# Patient Record
Sex: FEMALE | Race: BLACK OR AFRICAN AMERICAN | NOT HISPANIC OR LATINO | Employment: FULL TIME | URBAN - METROPOLITAN AREA
[De-identification: names, ages, dates, MRNs, and addresses within clinical notes are randomized per-mention and may not be internally consistent; named-entity substitution may affect disease eponyms.]

---

## 2017-01-05 ENCOUNTER — OFFICE VISIT (OUTPATIENT)
Dept: OPHTHALMOLOGY | Facility: CLINIC | Age: 27
End: 2017-01-05
Payer: COMMERCIAL

## 2017-01-05 DIAGNOSIS — H52.13 MYOPIA OF BOTH EYES WITH ASTIGMATISM: ICD-10-CM

## 2017-01-05 DIAGNOSIS — H52.203 MYOPIA OF BOTH EYES WITH ASTIGMATISM: ICD-10-CM

## 2017-01-05 DIAGNOSIS — H16.203 KERATOCONJUNCTIVITIS, BILATERAL: Primary | ICD-10-CM

## 2017-01-05 PROCEDURE — 92002 INTRM OPH EXAM NEW PATIENT: CPT | Mod: S$GLB,,, | Performed by: OPTOMETRIST

## 2017-01-05 PROCEDURE — 92015 DETERMINE REFRACTIVE STATE: CPT | Mod: S$GLB,,, | Performed by: OPTOMETRIST

## 2017-01-05 PROCEDURE — 99999 PR PBB SHADOW E&M-NEW PATIENT-LVL I: CPT | Mod: PBBFAC,,, | Performed by: OPTOMETRIST

## 2017-01-05 RX ORDER — NEOMYCIN SULFATE, POLYMYXIN B SULFATE AND DEXAMETHASONE 3.5; 10000; 1 MG/ML; [USP'U]/ML; MG/ML
1 SUSPENSION/ DROPS OPHTHALMIC 4 TIMES DAILY
Qty: 5 ML | Refills: 0 | Status: SHIPPED | OUTPATIENT
Start: 2017-01-05 | End: 2017-01-12

## 2017-01-05 NOTE — PROGRESS NOTES
HPI     Eye Problem    Additional comments: irritation OU x 2 weeks           Comments   PT c/o pain, burning, watering and irritation in both eyes for 2 weeks.   Wears cls full time, does not have gls and would like to get a gls rx if   possible today.  Does not sleep in lenses per pt.   Pain Scale:  5  Onset:   2 weeks  OD, OS, OU:   OU*  Discharge:   watering  A.M. Matting:  no  Itch:   At times  Redness:   yes  Photophobia:   yes  Foreign body sensation:   yes  Deep pain:   no  Previous occurrence:   yes  Drops:   no         Last edited by Leigh Ann Bryan MA on 1/5/2017  8:11 AM. (History)            Assessment /Plan     For exam results, see Encounter Report.    Keratoconjunctivitis, bilateral  -     neomycin-polymyxin-dexamethasone (MAXITROL) 3.5mg/mL-10,000 unit/mL-0.1 % DrpS; Place 1 drop into both eyes 4 (four) times daily.  Dispense: 5 mL; Refill: 0  D/c cls wear as soon as possible, pt will be getting spec rx filled  Start maxitrol as directed    Myopia of both eyes with astigmatism  Eyeglass Final Rx     Eyeglass Final Rx      Sphere Cylinder Axis   Right -5.50 +0.50 015   Left -7.50 +0.50 105       Expiration Date:  1/6/2018            RTC 1-2 wks for dilation, PRN sooner if symptoms worsen or persist x 1 wk  Discussed above and all questions were answered.

## 2017-02-10 ENCOUNTER — OFFICE VISIT (OUTPATIENT)
Dept: INTERNAL MEDICINE | Facility: CLINIC | Age: 27
End: 2017-02-10
Payer: COMMERCIAL

## 2017-02-10 VITALS
TEMPERATURE: 98 F | BODY MASS INDEX: 31.16 KG/M2 | WEIGHT: 187 LBS | HEIGHT: 65 IN | SYSTOLIC BLOOD PRESSURE: 128 MMHG | OXYGEN SATURATION: 99 % | DIASTOLIC BLOOD PRESSURE: 84 MMHG | HEART RATE: 73 BPM

## 2017-02-10 DIAGNOSIS — F41.1 GENERALIZED ANXIETY DISORDER: Primary | ICD-10-CM

## 2017-02-10 PROCEDURE — 99202 OFFICE O/P NEW SF 15 MIN: CPT | Mod: S$GLB,,,

## 2017-02-10 PROCEDURE — 99999 PR PBB SHADOW E&M-EST. PATIENT-LVL III: CPT | Mod: PBBFAC,,,

## 2017-02-10 RX ORDER — VENLAFAXINE HYDROCHLORIDE 75 MG/1
75 CAPSULE, EXTENDED RELEASE ORAL DAILY
Qty: 30 CAPSULE | Refills: 11 | Status: SHIPPED | OUTPATIENT
Start: 2017-02-10 | End: 2017-03-10 | Stop reason: SDUPTHER

## 2017-02-10 NOTE — LETTER
February 10, 2017      Jamey Blankenship MD  [address] [id number] [place] [address] Internal Medicine  [address] [facility] [address]  Phone: [phone]          Patient: Alyssa Urena   MR Number: [id number]   YOB: 1990   Date of Visit: 2/10/2017       Dear Dr. Jamey Blankenship:    Thank you for referring Alyssa Urena to me for evaluation. Attached you will find relevant portions of my assessment and plan of care.    If you have questions, please do not hesitate to call me. I look forward to following Alyssa Urena along with you.    Sincerely,    Antonio Stevens, [facility]    Enclosure  CC:  No Recipients    If you would like to receive this communication electronically, please contact [email or web address] or [phone] to request more information on [employer] access.    For providers and/or their staff who would like to refer a patient to [facility], please contact us through our one-stop-shop provider referral line, Raymundo Maria, at [phone].    If you feel you have received this communication in error or would no longer like to receive these types of communications, please e-mail [email or web address]

## 2017-02-10 NOTE — PROGRESS NOTES
Subjective:       Patient ID: Alyssa Urena is a 26 y.o. female.    Chief Complaint: Anxiety (having severe anxiety , stressed at work, down a few co-workers )    HPI   The patient presents with a 3 week complaint of stress and anxiety at work.  She states that her jaw they're understaffed and she feels overworked most of the time.  She was anxiety is intermittent moderate to severe intensity feeling of being overwhelmed.  She states sometimes she has to just go to a room by herself.  She voices associated trouble sleeping stating that she wakes up many times during the night wondering about job duties.  She says she is also getting an abbreviated amount of sleep since she usually has been about 10 or 11 PM and wakes up almost every morning at 3 AM.     Review of Systems   Constitutional: Negative for activity change, appetite change, fatigue and unexpected weight change.   HENT: Negative.    Eyes: Negative.    Respiratory: Negative for cough, chest tightness, shortness of breath and wheezing.    Cardiovascular: Negative for chest pain, palpitations and leg swelling.   Gastrointestinal: Negative for constipation, diarrhea, nausea and vomiting.   Endocrine: Negative.    Genitourinary: Negative.    Musculoskeletal: Negative.    Skin: Negative for color change.   Allergic/Immunologic: Negative.    Neurological: Negative for dizziness, weakness and light-headedness.   Hematological: Negative.    Psychiatric/Behavioral: Negative for sleep disturbance. The patient is nervous/anxious.          Objective:      Physical Exam   Constitutional: She is oriented to person, place, and time. She appears well-developed and well-nourished.   HENT:   Head: Normocephalic and atraumatic.   Eyes: Conjunctivae are normal. Pupils are equal, round, and reactive to light. No scleral icterus.   Neck: Normal range of motion. Neck supple.   Cardiovascular: Normal rate, regular rhythm, normal heart sounds and intact distal pulses.   Exam reveals no gallop and no friction rub.    No murmur heard.  Pulmonary/Chest: Effort normal and breath sounds normal. No respiratory distress. She has no wheezes. She has no rales. She exhibits no tenderness.   Musculoskeletal: Normal range of motion.   Lymphadenopathy:     She has no cervical adenopathy.   Neurological: She is alert and oriented to person, place, and time. She exhibits normal muscle tone. Coordination normal.   Skin: Skin is warm and dry. No rash noted. No erythema. No pallor.   Psychiatric: She has a normal mood and affect. Her behavior is normal. Judgment and thought content normal.   Vitals reviewed.      Assessment:       1. Generalized anxiety disorder          Plan:   Generalized anxiety disorder  -     venlafaxine (EFFEXOR-XR) 75 MG 24 hr capsule; Take 1 capsule (75 mg total) by mouth once daily.  Dispense: 30 capsule; Refill: 11            Disclaimer: This note is prepared using voice recognition software.  As such there may be errors in the dictation.  It has not been proofread.

## 2017-03-01 ENCOUNTER — PATIENT MESSAGE (OUTPATIENT)
Dept: INTERNAL MEDICINE | Facility: CLINIC | Age: 27
End: 2017-03-01

## 2017-03-02 DIAGNOSIS — F51.04 PSYCHOPHYSIOLOGICAL INSOMNIA: Primary | ICD-10-CM

## 2017-03-02 RX ORDER — HYDROXYZINE PAMOATE 25 MG/1
25 CAPSULE ORAL NIGHTLY PRN
Qty: 20 CAPSULE | Refills: 2 | Status: SHIPPED | OUTPATIENT
Start: 2017-03-02 | End: 2017-03-07

## 2017-03-10 DIAGNOSIS — F41.1 GENERALIZED ANXIETY DISORDER: ICD-10-CM

## 2017-03-10 RX ORDER — VENLAFAXINE HYDROCHLORIDE 75 MG/1
75 CAPSULE, EXTENDED RELEASE ORAL DAILY
Qty: 30 CAPSULE | Refills: 11 | Status: SHIPPED | OUTPATIENT
Start: 2017-03-10 | End: 2017-03-30 | Stop reason: DRUGHIGH
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
## 2017-03-30 ENCOUNTER — PATIENT MESSAGE (OUTPATIENT)
Dept: INTERNAL MEDICINE | Facility: CLINIC | Age: 27
End: 2017-03-30

## 2017-03-30 DIAGNOSIS — F32.A DEPRESSION, UNSPECIFIED DEPRESSION TYPE: Primary | ICD-10-CM

## 2017-03-30 RX ORDER — VENLAFAXINE HYDROCHLORIDE 75 MG/1
75 CAPSULE, EXTENDED RELEASE ORAL DAILY
Qty: 30 CAPSULE | Refills: 11 | Status: SHIPPED | OUTPATIENT
Start: 2017-03-30 | End: 2017-07-24 | Stop reason: SDUPTHER

## 2017-04-28 ENCOUNTER — PROCEDURE VISIT (OUTPATIENT)
Dept: OBSTETRICS AND GYNECOLOGY | Facility: CLINIC | Age: 27
End: 2017-04-28
Payer: COMMERCIAL

## 2017-04-28 VITALS
SYSTOLIC BLOOD PRESSURE: 112 MMHG | DIASTOLIC BLOOD PRESSURE: 70 MMHG | BODY MASS INDEX: 30.08 KG/M2 | WEIGHT: 180.75 LBS

## 2017-04-28 DIAGNOSIS — Z12.4 PAP SMEAR FOR CERVICAL CANCER SCREENING: Primary | ICD-10-CM

## 2017-04-28 DIAGNOSIS — Z11.3 SCREEN FOR STD (SEXUALLY TRANSMITTED DISEASE): ICD-10-CM

## 2017-04-28 DIAGNOSIS — Z30.432 ENCOUNTER FOR IUD REMOVAL: ICD-10-CM

## 2017-04-28 PROCEDURE — 87591 N.GONORRHOEAE DNA AMP PROB: CPT

## 2017-04-28 PROCEDURE — 99385 PREV VISIT NEW AGE 18-39: CPT | Mod: 25,S$GLB,, | Performed by: OBSTETRICS & GYNECOLOGY

## 2017-04-28 PROCEDURE — 88175 CYTOPATH C/V AUTO FLUID REDO: CPT | Performed by: PATHOLOGY

## 2017-04-28 PROCEDURE — 58301 REMOVE INTRAUTERINE DEVICE: CPT | Mod: S$GLB,,, | Performed by: OBSTETRICS & GYNECOLOGY

## 2017-04-28 PROCEDURE — 88141 CYTOPATH C/V INTERPRET: CPT | Mod: ,,, | Performed by: PATHOLOGY

## 2017-04-28 NOTE — PROGRESS NOTES
Subjective:       Patient ID: Alyssa Urena is a 26 y.o. female.    Chief Complaint:  mirena removal      History of Present Illness  HPI  Annual Exam-Premenopausal  Patient presents for annual exam. Pt complains of daily vaginal spotting and bleeding for the past 3-4 months.  Pt has Mirena in place (inserted 4 years ago). Pt had no issues with Mirena until these past several months.  Would like to have Mirena removed today and discuss contraception options.  The patient is sexually active. GYN screening history: last pap: approximate date 2016 and was abnormal: pt does not recall result. The patient wears seatbelts: yes. The patient participates in regular exercise: no. Has the patient ever been transfused or tattooed?: no. The patient reports that there is not domestic violence in her life.      GYN & OB History  No LMP recorded. Patient is not currently having periods (Reason: Birth Control).   Date of Last Pap: No result found    OB History   No data available       Review of Systems  Review of Systems   Constitutional: Negative for activity change, appetite change, fatigue, fever and unexpected weight change.   Respiratory: Negative for shortness of breath.    Cardiovascular: Negative for chest pain, palpitations and leg swelling.   Gastrointestinal: Positive for nausea. Negative for abdominal pain, constipation, diarrhea and vomiting.   Genitourinary: Positive for menstrual problem and vaginal bleeding. Negative for dyspareunia, dysuria, genital sores, hematuria, pelvic pain, vaginal discharge, vaginal pain, dysmenorrhea and vaginal odor.   Musculoskeletal: Negative for back pain.   Neurological: Negative for syncope and headaches.   Breast: Negative for breast mass, breast pain, nipple discharge and skin changes          Objective:    Physical Exam:   Constitutional: She is oriented to person, place, and time. She appears well-developed and well-nourished. No distress.     Eyes: EOM are normal.  Pupils are equal, round, and reactive to light.    Neck: Normal range of motion. Neck supple.    Cardiovascular: Normal rate, regular rhythm and normal heart sounds.     Pulmonary/Chest: Effort normal and breath sounds normal.        Abdominal: Soft. Bowel sounds are normal. She exhibits no distension. There is no tenderness.     Genitourinary: Uterus normal.       Pelvic exam was performed with patient supine. There is lesion on the right labia. There is no rash, tenderness or injury on the right labia. There is no rash, tenderness, lesion or injury on the left labia. Uterus is not deviated, not enlarged and not tender. Cervix is normal. Right adnexum displays no mass, no tenderness and no fullness. Left adnexum displays no mass, no tenderness and no fullness. No erythema, tenderness or bleeding in the vagina. No foreign body in the vagina. No signs of injury around the vagina. No vaginal discharge found. Cervix exhibits no motion tenderness, no discharge and no friability. Additional cervical findings: pap smear done  Genitourinary Comments: See procedure note for Mirena removal           Musculoskeletal: Normal range of motion and moves all extremeties. She exhibits no edema or tenderness.       Neurological: She is oriented to person, place, and time.    Skin: Skin is warm and dry.    Psychiatric: She has a normal mood and affect. Her behavior is normal. Thought content normal.          Assessment:        1. Pap smear for cervical cancer screening    2. Encounter for IUD removal    3. Screen for STD (sexually transmitted disease)             Plan:      Pap smear for cervical cancer screening  -     Liquid-based pap smear, screening  -     Pt was counseled on cervical/vaginal screening guidelines and recommendations.  Last pap was abnormal but pt does not recall result on 2016.  If today's pap smear result is negative, next pap smear will be due in 2020.  -     Pt was advised on current breast cancer screening  recommendations.   -     Follow up with PCP for routine health maintenance needs.    Encounter for IUD removal  -     Removal of Intrauterine Device-Today  -     Pt was counseled on common IUD side-effects (including the breakthrough bleeding she is currently experiencing).  Pt also counseled on treatment options and on contraception alternatives.  Pt voiced understanding and desires to proceed with Mirena removal today.  Pt desires to take a small break off of hormones and desires to restart Mirena in near future.      Screen for STD (sexually transmitted disease)  -     C. trachomatis/N. gonorrhoeae by AMP DNA Cervix      Return for Mirena Insertion.

## 2017-04-28 NOTE — PROCEDURES
Removal of Intrauterine Device-Today  Date/Time: 4/28/2017 2:03 PM  Performed by: ABENA IQBAL.  Authorized by: ABENA IQBAL   Local anesthesia used: no    Anesthesia:  Local anesthesia used: no  Sedation:  Patient sedated: no    Patient tolerance: Patient tolerated the procedure well with no immediate complications  Comments: Alyssa Urena is a 26 y.o. female No obstetric history on file. presents for IUD removal because of persistent bleeding.  Pt would like to take a break from contraception but is interested in restarting Mirena in several months.      PRE-IUD REMOVAL COUNSELING:  The patient was advised of minimal risks of bleeding and pain and she agrees to proceed.    PROCEDURE:  TIME OUT PERFORMED.  IUD strings were  visualized at the os and grasped. IUD removed with gentle traction.  The patient tolerated the procedure well.    ASSESSMENT:  Contraceptive Management / Removal IUD. V25.0.    POST IUD REMOVAL COUNSELING:  Expect period-like flow to occur after Mirena IUD removal and periods to return to pre-IUD pattern.  Manage post IUD removal cramping with NSAIDs, Tylenol or Rx per [employer].    POST IUD REMOVAL CONTRACEPTION:  If planning pregnancy, RX for prenatal vitamins.    Counseling lasted approximately 15 minutes and all her questions were answered.    FOLLOW-UP: With me for Mirena insertion.

## 2017-04-29 LAB
C TRACH DNA SPEC QL NAA+PROBE: NOT DETECTED
N GONORRHOEA DNA SPEC QL NAA+PROBE: NOT DETECTED

## 2017-05-03 ENCOUNTER — TELEPHONE (OUTPATIENT)
Dept: OBSTETRICS AND GYNECOLOGY | Facility: CLINIC | Age: 27
End: 2017-05-03

## 2017-05-10 ENCOUNTER — HOSPITAL ENCOUNTER (OUTPATIENT)
Dept: RADIOLOGY | Facility: HOSPITAL | Age: 27
Discharge: HOME | End: 2017-05-10
Attending: ORTHOPAEDIC SURGERY
Payer: COMMERCIAL

## 2017-05-10 DIAGNOSIS — M54.50 LUMBAR SPINE PAIN: ICD-10-CM

## 2017-05-10 DIAGNOSIS — M54.50 LUMBAR SPINE PAIN: Primary | ICD-10-CM

## 2017-05-10 PROCEDURE — 72110 X-RAY EXAM L-2 SPINE 4/>VWS: CPT | Mod: 26,,, | Performed by: RADIOLOGY

## 2017-05-10 PROCEDURE — 72110 X-RAY EXAM L-2 SPINE 4/>VWS: CPT | Mod: TC

## 2017-05-19 ENCOUNTER — TELEPHONE (OUTPATIENT)
Dept: OBSTETRICS AND GYNECOLOGY | Facility: CLINIC | Age: 27
End: 2017-05-19

## 2017-05-19 NOTE — TELEPHONE ENCOUNTER
Attempted to call patient, left voicemail to inform that it has arrived and should call back when she starts her menstrual cycle to schedule appointment.

## 2017-06-20 ENCOUNTER — HOSPITAL ENCOUNTER (OUTPATIENT)
Dept: RADIOLOGY | Facility: HOSPITAL | Age: 27
Discharge: HOME | End: 2017-06-20
Attending: FAMILY MEDICINE
Payer: COMMERCIAL

## 2017-06-20 ENCOUNTER — CLINICAL SUPPORT (OUTPATIENT)
Dept: CARDIOLOGY | Facility: CLINIC | Age: 27
End: 2017-06-20
Payer: COMMERCIAL

## 2017-06-20 ENCOUNTER — OFFICE VISIT (OUTPATIENT)
Dept: INTERNAL MEDICINE | Facility: CLINIC | Age: 27
End: 2017-06-20
Payer: COMMERCIAL

## 2017-06-20 VITALS
DIASTOLIC BLOOD PRESSURE: 80 MMHG | HEIGHT: 65 IN | WEIGHT: 186.75 LBS | TEMPERATURE: 97 F | HEART RATE: 95 BPM | OXYGEN SATURATION: 100 % | SYSTOLIC BLOOD PRESSURE: 124 MMHG | BODY MASS INDEX: 31.11 KG/M2

## 2017-06-20 DIAGNOSIS — R42 DIZZINESS: ICD-10-CM

## 2017-06-20 DIAGNOSIS — R07.9 CHEST PAIN AT REST: ICD-10-CM

## 2017-06-20 DIAGNOSIS — R07.9 CHEST PAIN AT REST: Primary | ICD-10-CM

## 2017-06-20 PROCEDURE — 71020 XR CHEST PA AND LATERAL: CPT | Mod: 26,,, | Performed by: RADIOLOGY

## 2017-06-20 PROCEDURE — 71020 XR CHEST PA AND LATERAL: CPT | Mod: TC,PO

## 2017-06-20 PROCEDURE — 99999 PR PBB SHADOW E&M-EST. PATIENT-LVL IV: CPT | Mod: PBBFAC,,, | Performed by: PHYSICIAN ASSISTANT

## 2017-06-20 PROCEDURE — 99214 OFFICE O/P EST MOD 30 MIN: CPT | Mod: S$GLB,,, | Performed by: PHYSICIAN ASSISTANT

## 2017-06-20 PROCEDURE — 93000 ELECTROCARDIOGRAM COMPLETE: CPT | Mod: S$GLB,,, | Performed by: INTERNAL MEDICINE

## 2017-06-20 RX ORDER — MECLIZINE HCL 12.5 MG 12.5 MG/1
12.5 TABLET ORAL 3 TIMES DAILY PRN
Qty: 15 TABLET | Refills: 0 | Status: SHIPPED | OUTPATIENT
Start: 2017-06-20 | End: 2017-06-29

## 2017-06-20 NOTE — PATIENT INSTRUCTIONS
Dizziness (Vertigo) and Balance Problems: Diagnostic Tests    An otolaryngologist (also called an ENT) is a doctor who specializes in disorders of the ear, nose, and throat. Your ENT can help find clues to the cause of your dizziness. He or she will examine you and go over your health history. Your ENT may also order certain tests to help diagnose your problem.  Hearing testing  In most cases, you will be referred for hearing testing. This is because the nerve that sends balance signals also sends hearing signals. A problem that affects balance can also affect hearing.  Other tests  Your doctor may recommend more than one kind of test. The following tests are painless, but may cause dizziness in some cases.  · MRI creates images of the ear or head. A magnetic field and contrast medium are used to make the image.  · Electronystagmography (ENG) records eye movement. Small electrodes are put on the skin around your eyes. Then your ear is filled with warm or cold water.  · Rotation tests show the relationship between the inner ear and your eyes. You may be asked to wear special goggles or sit in a computerized chair.  · Posturography tests your standing balance under different conditions. You will stand on a platform that measures shifts in your body weight.   · Electrocochleography (ECoG) measures the fluid pressure in the inner ear. An abnormal ECoG may mean you have Meniere's disease or other conditions.  · Vestibular evoked myogenic potentials (VEMPs) may be used if your healthcare provider suspects a rare condition like superior semicircular canal dehiscence. Electrodes are placed on your neck, and you hear clicks in your ear.  Date Last Reviewed: 11/1/2016 © 2000-2016 [employer]. [address] [place] [address], [place] [address]. All rights reserved. This information is not intended as a substitute for professional medical care. Always follow your healthcare professional's instructions.        Dizziness  (Vertigo) and Balance Problems: Staying Safe     Replace burned-out lightbulbs to keep your home safe and well lit.   Falls or accidents can lead to pain, broken bones, and fear of future falls. Protect yourself and others by preparing for episodes. Simple steps can help you stay safe at home and wherever you go.  Lighting  Keep all areas well lit. This helps your eyes send the right signals to the brain. It also makes you less likely to trip and fall. If bright lights make symptoms worse, dim the lights or lie in a dark room until the dizziness passes. Then turn the lights back to their normal level.  Tips:  · Keep a flashlight by the bed.  · Place nightlights in bathrooms and hallways.  · Replace burned-out bulbs, or have someone replace them for you.  Preventing falls  To reduce your risk of falling:  · Get out of bed or up from a chair slowly.  · Wear low-heeled shoes that fit properly and have slip-resistant soles.  · Remove throw rugs. Clear clutter from walkways.  · Use handrails on stairs. Have handrails installed or adjusted if needed.  · Install grab bars in the bathroom. Don't use towel racks for balance.  · Use a shower stool. Also put adhesive strips in the shower or on the tub floor.  Going out  With a little time and preparation, you can get around safely.  Tips:  · Bring a cane or walking aid if needed.  · Give yourself plenty of time in case you start to get dizzy.  · Ask your healthcare provider what type of exercise is safe for your condition.  · Be patient. If an activity such as walking through a crowded shop causes you stress, you may not be ready for it yet.  Driving  If you become dizzy or disoriented while driving, you could hurt yourself and others. That's why it's best to not drive until symptoms have gone away. In some cases, your license may be temporarily held until it's safe for you to drive again.  For safety:  · Ask a friend to drive for you.  · Take public transportation.  · Walk to  stores and other places when you can.  Asking for help  Don't be afraid to ask for help running errands, cooking meals, and doing exercise. Whether it's a friend, loved one, neighbor, or stranger on the street, a little help can make a world of difference.   Date Last Reviewed: 11/1/2016 © 2000-2016 [employer]. [address] [place] [address], [place] [address]. All rights reserved. This information is not intended as a substitute for professional medical care. Always follow your healthcare professional's instructions.        Vertigo (Unknown Cause)    In addition to helping with hearing, the inner ear is part of the balance center of your body. Problems with the inner ear can a false feeling of motion. This is called vertigo. Often, it feels as if you or the room is spinning. A vertigo attack may cause sudden nausea, vomiting and heavy sweating. Severe vertigo causes a loss of balance and can cause you to fall. During vertigo, small head movements and changes in body position will often make the symptoms worse. You may also have ringing in the ears called tinnitus.  An episode of vertigo may last seconds, minutes or hours. Once you are over the first episode, it may never come back. However, symptoms may return off and on.  The cause of your vertigo is not yet known. Possible causes of vertigo include:  · Inflammation of the inner ear  · Disease of the nerves to the inner ear  · Movement of calcium particles in the inner ear  · Poor blood flow to the balance centers of the brain  · Migraine headaches  Home care  · If symptoms are severe, rest quietly in bed. Change positions very slowly. There is usually one position that will feel best, such as lying on one side or lying on your back with your head slightly raised on pillows.  · Do not drive a car or work with dangerous machinery until symptoms have been gone for at least one week.  · Take medicine as prescribed to relieve your symptoms. Unless another  medicine was prescribed for symptoms of nausea, vomiting, and dizziness, you may use over-the-counter motion sickness pills. Ask your pharmacist for suggestions.  Follow-up care  Follow up with your healthcare provider or as directed. If you are referred to a specialist or for testing, make the appointment promptly.  When to seek medical advice  Call your healthcare provider if any of the following occur:  · Fever of 100.4°F (38ºC) or higher, or as directed by your healthcare provider  · Vertigo worsens or is not controlled by prescribed medicine   · Repeated vomiting not relieved by prescribed medicine   · Severe headache  · Confusion  · Weakness of an arm or leg or one side of the face  · Difficulty with speech or vision  · Loss of consciousness   · Seizure  Date Last Reviewed: 8/16/2015 © 2000-2016 [employer]. [address] [place] [address], [place] [address]. All rights reserved. This information is not intended as a substitute for professional medical care. Always follow your healthcare professional's instructions.        Noncardiac Chest Pain    Based on your visit today, the health care provider doesnt know what is causing your chest pain. In most cases, people who come to the emergency department with chest pain dont have a problem with their heart. Instead, the pain is caused by other conditions. These may be problems with the lungs, muscles, bones, digestive tract, nerves, or mental health.  Lung problems  · Inflammation around the lungs (pleurisy)  · Collapsed lung (pneumothorax)  · Fluid around the lungs (pleural effusion)  · Lung cancer. This is a rare cause of chest pain.  Muscle or bone problems  · Inflamed cartilage between the ribs (pleurisy)  · Fibromyalgia  · Rheumatoid arthritis  Digestive system problems  · Reflux  · Stomach ulcer  · Spasms of the esophagus  · Gall stones  · Gallbladder inflammation  Mental health conditions  · Panic or anxiety attacks  · Emotional distress  Your  condition doesnt seem serious and your pain doesnt appear to be coming from your heart. But sometimes the signs of a serious problem take more time to appear. Watch for the warning signs listed below.  Home care  Follow these guidelines when caring for yourself at home:  · Rest today and avoid strenuous activity.  · Take any prescribed medicine as directed.  Follow-up care  Follow up with your health care provider, or as advised, if you dont start to feel better within 24 hours.  When to seek medical advice  Call your health care provider right away if any of these occur:  · A change in the type of pain. Call if it feels different, becomes more serious, lasts longer, or begins to spread into your shoulder, arm, neck, jaw, or back.  · Shortness of breath  · You feel more pain when you breathe  · Cough with dark-colored mucus or blood  · Weakness, dizziness, or fainting  · Fever of 100.4ºF (38ºC) or higher, or as directed by your health care provider  · Swelling, pain, or redness in one leg  Date Last Reviewed: 11/24/2014  © 4451-4655 [employer]. [address] [place] [address]. All rights reserved. This information is not intended as a substitute for professional medical care. Always follow your healthcare professional's instructions.

## 2017-06-20 NOTE — PROGRESS NOTES
Subjective:      Patient ID: Alyssa Urena is a 26 y.o. female.    Chief Complaint: Dizziness    Denies having problems with chest pain or dizziness in the past.       Dizziness:   Chronicity:  New  Progression since onset:  Resolved, then recurrent and gradually worsening  Frequency:  Hourly  Severity:  Disabling  Duration:  Very brief  Dizziness characteristics: world is spinning.  Frequency of Spells:  Daily   Associated symptoms: headaches and chest pain (started prior to the dizziness).no hearing loss, no ear congestion, no ear pain, no fever, no tinnitus, no nausea, no vomiting, no diaphoresis, no aural fullness, no weakness, no visual disturbances, no light-headedness, no syncope, no palpitations, no panic, no facial weakness, no slurred speech and no numbness in extremities.   Extremity tingling  Aggravated by:  Position changes  Treatments tried:  Nothing   PMH includes: anxiety.no strokes, no cardiac surgery, no neurologic disease, no head trauma, no balance testing, no ear trauma, no ear surgery, no head trauma, no ear infections, no ear tubes, no environmental allergies, no MRI head and no CT head.  Chest Pain    This is a new problem. The current episode started in the past 7 days. The onset quality is sudden. The problem occurs intermittently. The problem has been waxing and waning. The pain is moderate. The quality of the pain is described as sharp and pressure. The pain does not radiate. Associated symptoms include dizziness and headaches. Pertinent negatives include no abdominal pain, back pain, claudication, cough, diaphoresis, exertional chest pressure, fever, hemoptysis, irregular heartbeat, leg pain, lower extremity edema, malaise/fatigue, nausea, near-syncope, numbness, orthopnea, palpitations, PND, shortness of breath, sputum production, syncope, vomiting or weakness. Associated symptoms comments: Woke her up in the middle of the night, also had tingling going down both her  arms. Only lasted a few seconds. . Treatments tried: hyperventilating  The treatment provided moderate relief.   Her past medical history is significant for anxiety/panic attacks.   Pertinent negatives for past medical history include no aneurysm, no aortic aneurysm, no aortic dissection, no bicuspid aortic valve, no CAD, no cancer, no congenital heart disease, no connective tissue disease, no COPD, no CHF, no diabetes, no DVT, no hyperhomocysteinemia, no hyperlipidemia, no hypertension, no Kawasaki disease, no Marfan's syndrome, no MI, no mitral valve prolapse, no pacemaker, no PE, no PVD, no recent injury, no rheumatic fever, no seizures, no sickle cell disease, no sleep apnea, no spontaneous pneumothorax, no stimulant use, no strokes, no thyroid problem, no TIA, Mcpherson syndrome and no valve disorder.   Her family medical history is significant for diabetes (grandmother), heart disease, hypertension and TIA (mom).   Pertinent negatives for family medical history include: no aortic dissection, no CAD, no connective tissue disease, no early MI (uncle had heart attack in his 40's), no PE, no PVD, no sickle cell disease, no stroke and no sudden death.       Review of Systems   Constitutional: Negative for activity change, appetite change, chills, diaphoresis, fatigue, fever, malaise/fatigue and unexpected weight change.   HENT: Negative for congestion, ear pain, facial swelling, hearing loss, mouth sores, nosebleeds, postnasal drip, rhinorrhea, sinus pressure, sneezing, sore throat, tinnitus, trouble swallowing and voice change.    Eyes: Negative.  Negative for visual disturbance.   Respiratory: Positive for chest tightness. Negative for cough, hemoptysis, sputum production, choking, shortness of breath and stridor.    Cardiovascular: Positive for chest pain (started prior to the dizziness). Negative for palpitations, orthopnea, claudication, leg swelling, syncope, PND and near-syncope.   Gastrointestinal: Negative  "for abdominal distention, abdominal pain, anal bleeding, blood in stool, constipation, diarrhea, nausea and vomiting.   Endocrine: Negative for cold intolerance, heat intolerance, polydipsia and polyuria.   Genitourinary: Negative.  Negative for difficulty urinating and frequency.   Musculoskeletal: Negative for arthralgias, back pain, gait problem, joint swelling and myalgias.   Skin: Negative for color change, pallor, rash and wound.   Allergic/Immunologic: Negative for environmental allergies.   Neurological: Positive for dizziness and headaches. Negative for tremors, seizures, facial asymmetry, weakness, light-headedness and numbness.   Hematological: Negative for adenopathy.   Psychiatric/Behavioral: Negative for behavioral problems, confusion, self-injury, sleep disturbance and suicidal ideas. The patient is not nervous/anxious.      Objective:   /80   Pulse 95   Temp 97 °F (36.1 °C) (Tympanic)   Ht 5' 5" (1.651 m)   Wt 84.7 kg (186 lb 11.7 oz)   SpO2 100%   BMI 31.07 kg/m²     Physical Exam   Constitutional: She is oriented to person, place, and time. She appears well-developed and well-nourished. She is cooperative.   HENT:   Head: Normocephalic and atraumatic.   Right Ear: Hearing, tympanic membrane, external ear and ear canal normal.   Left Ear: Hearing, tympanic membrane, external ear and ear canal normal.   Nose: Nose normal. No mucosal edema.   Mouth/Throat: Uvula is midline, oropharynx is clear and moist and mucous membranes are normal. No tonsillar exudate.   Eyes: Conjunctivae and EOM are normal. Pupils are equal, round, and reactive to light. Right eye exhibits no discharge. Left eye exhibits no discharge. No scleral icterus.   Neck: Normal range of motion. Neck supple. Normal carotid pulses and no JVD present. Carotid bruit is not present. No edema present. No thyromegaly present.   Cardiovascular: Normal rate, regular rhythm and normal heart sounds.  Exam reveals no gallop and no " friction rub.    No murmur heard.  Pulmonary/Chest: Effort normal and breath sounds normal. No accessory muscle usage. No respiratory distress. She has no wheezes. She has no rales. She exhibits no tenderness.   Abdominal: Soft. Bowel sounds are normal. She exhibits no distension and no mass. There is no tenderness. There is no rebound and no guarding.   Musculoskeletal: Normal range of motion. She exhibits no edema, tenderness or deformity.   Lymphadenopathy:     She has no cervical adenopathy.   Neurological: She is alert and oriented to person, place, and time. She displays normal reflexes. No cranial nerve deficit or sensory deficit. She exhibits normal muscle tone. She displays a negative Romberg sign. Coordination and gait normal.   Skin: Skin is warm, dry and intact. No rash noted. No cyanosis or erythema. No pallor. Nails show no clubbing.   Psychiatric: She has a normal mood and affect. Her behavior is normal. Judgment and thought content normal.   Nursing note and vitals reviewed.      Assessment:     1. Chest pain at rest    2. Dizziness      Plan:   Chest pain at rest  -     CBC auto differential; Future; Expected date: 06/20/2017  -     Comprehensive metabolic panel; Future  -     TSH; Future  -     EKG 12-lead; Future  -     X-Ray Chest PA And Lateral; Future; Expected date: 06/20/2017    Dizziness  -     CBC auto differential; Future; Expected date: 06/20/2017  -     Comprehensive metabolic panel; Future  -     TSH; Future  -     EKG 12-lead; Future  -     Ambulatory referral to ENT  -     meclizine (ANTIVERT) 12.5 mg tablet; Take 1 tablet (12.5 mg total) by mouth 3 (three) times daily as needed for Dizziness.  Dispense: 15 tablet; Refill: 0      -discontinue meclizine at 3 days prior to appointment with ENT. Dizziness precautions discussed. A handout provided.     Return if symptoms worsen or fail to improve.

## 2017-06-26 ENCOUNTER — PATIENT MESSAGE (OUTPATIENT)
Dept: OBSTETRICS AND GYNECOLOGY | Facility: CLINIC | Age: 27
End: 2017-06-26

## 2017-06-27 NOTE — TELEPHONE ENCOUNTER
I see that you have an appointment scheduled on 06/29 which is on Thursday at 8:30am.  You can still keep this appointment, however since you want to have the mirena inserted we will use this appointment to insert the device and if you are due for your pap smear we can schedule for another day.  Please let me know if this is ok for you.

## 2017-06-29 ENCOUNTER — OFFICE VISIT (OUTPATIENT)
Dept: OBSTETRICS AND GYNECOLOGY | Facility: CLINIC | Age: 27
End: 2017-06-29
Payer: COMMERCIAL

## 2017-06-29 VITALS
SYSTOLIC BLOOD PRESSURE: 122 MMHG | HEIGHT: 65 IN | WEIGHT: 185.19 LBS | BODY MASS INDEX: 30.85 KG/M2 | DIASTOLIC BLOOD PRESSURE: 92 MMHG

## 2017-06-29 DIAGNOSIS — Z30.430 ENCOUNTER FOR IUD INSERTION: Primary | ICD-10-CM

## 2017-06-29 PROCEDURE — 58300 INSERT INTRAUTERINE DEVICE: CPT | Mod: S$GLB,,, | Performed by: OBSTETRICS & GYNECOLOGY

## 2017-06-29 PROCEDURE — 81025 URINE PREGNANCY TEST: CPT | Mod: QW,S$GLB,, | Performed by: OBSTETRICS & GYNECOLOGY

## 2017-06-29 PROCEDURE — 99499 UNLISTED E&M SERVICE: CPT | Mod: S$GLB,,, | Performed by: OBSTETRICS & GYNECOLOGY

## 2017-06-29 PROCEDURE — 99999 PR PBB SHADOW E&M-EST. PATIENT-LVL II: CPT | Mod: PBBFAC,,, | Performed by: OBSTETRICS & GYNECOLOGY

## 2017-06-29 NOTE — PROCEDURES
INSERTION OF INTRAUTERINE DEVICE  Date/Time: 6/29/2017 9:31 AM  Performed by: ABENA IQBAL.  Authorized by: ABENA IQBAL   Preparation: Patient was prepped and draped in the usual sterile fashion.  Local anesthesia used: no    Anesthesia:  Local anesthesia used: no    Sedation:  Patient sedated: no  Patient tolerance: Patient tolerated the procedure well with no immediate complications  Comments: CC: MIRENA  IUD PLACEMENT    Alyssa Urena is a 26 y.o. female No obstetric history on file. presents for a Mirena IUD placement.      UPT is negative.      PRE-IUD PLACEMENT COUNSELING:  All contraceptive options were reviewed and the patient chooses an IUD.  The patient's history was reviewed and there are no contraindications to an IUD. The procedure and minimal risks of pain, bleeding, perforation, failure of function with resultant pregnancy (intrauterine or ectopic) and spontaneous expulsion were discussed. The benefits were explained. All questions were answered and the patient agrees to proceed. Consent was signed (scanned into computer).    EXAM:  Uterine Position: anteverted, normal size, nontender    PROCEDURE:  TIME OUT PERFORMED.  The cervix visualized with a speculum.  A single tooth tenaculum placed on the anterior lip.  The uterus sounded to 8 cm using sterile technique.  A Mirena IUD was loaded and placed high in uterine fundus without difficulty using sterile technique.  The string was cut to 2cm length from exo cervix.  The tenaculum and speculum were removed. The patient tolerated the procedure well.    ASSESSMENT:  1. Contraception management / IUD insertion.V25.0.    POST IUD PLACEMENT COUNSELING:  Manage post IUD placement pain with NSAIDs, Tylenol or Rx per [facility].  IUD danger signs and how to check the strings.  Removal in 5 years.    Counseling lasted approximately 15 minutes and all her questions were answered.    FOLLOW-UP: With me in 4 weeks.

## 2017-07-06 ENCOUNTER — HOSPITAL ENCOUNTER (OUTPATIENT)
Dept: RADIOLOGY | Facility: HOSPITAL | Age: 27
Discharge: HOME | End: 2017-07-06
Attending: ORTHOPAEDIC SURGERY
Payer: COMMERCIAL

## 2017-07-06 DIAGNOSIS — M25.561 CHRONIC PAIN OF RIGHT KNEE: Primary | ICD-10-CM

## 2017-07-06 DIAGNOSIS — G89.29 CHRONIC PAIN OF RIGHT KNEE: ICD-10-CM

## 2017-07-06 DIAGNOSIS — G89.29 CHRONIC PAIN OF RIGHT KNEE: Primary | ICD-10-CM

## 2017-07-06 DIAGNOSIS — M25.561 CHRONIC PAIN OF RIGHT KNEE: ICD-10-CM

## 2017-07-06 PROCEDURE — 73560 X-RAY EXAM OF KNEE 1 OR 2: CPT | Mod: TC,PO,LT

## 2017-07-06 PROCEDURE — 73562 X-RAY EXAM OF KNEE 3: CPT | Mod: 26,RT,, | Performed by: RADIOLOGY

## 2017-07-06 PROCEDURE — 73560 X-RAY EXAM OF KNEE 1 OR 2: CPT | Mod: 26,LT,, | Performed by: RADIOLOGY

## 2017-07-20 ENCOUNTER — OFFICE VISIT (OUTPATIENT)
Dept: INTERNAL MEDICINE | Facility: CLINIC | Age: 27
End: 2017-07-20
Payer: COMMERCIAL

## 2017-07-20 ENCOUNTER — LAB VISIT (OUTPATIENT)
Dept: LAB | Facility: HOSPITAL | Age: 27
End: 2017-07-20
Attending: INTERNAL MEDICINE
Payer: COMMERCIAL

## 2017-07-20 VITALS
HEART RATE: 136 BPM | HEIGHT: 65 IN | WEIGHT: 187.19 LBS | TEMPERATURE: 97 F | SYSTOLIC BLOOD PRESSURE: 122 MMHG | BODY MASS INDEX: 31.19 KG/M2 | OXYGEN SATURATION: 99 % | DIASTOLIC BLOOD PRESSURE: 78 MMHG

## 2017-07-20 DIAGNOSIS — R42 DIZZINESS: ICD-10-CM

## 2017-07-20 DIAGNOSIS — Z81.8 FAMILY HISTORY OF BIPOLAR DISORDER: ICD-10-CM

## 2017-07-20 DIAGNOSIS — Z82.49 FAMILY HISTORY OF EARLY CAD: ICD-10-CM

## 2017-07-20 DIAGNOSIS — R45.86 MOOD SWINGS: Primary | ICD-10-CM

## 2017-07-20 DIAGNOSIS — E03.8 SUBCLINICAL HYPOTHYROIDISM: ICD-10-CM

## 2017-07-20 LAB — TSH SERPL DL<=0.005 MIU/L-ACNC: 1.47 UIU/ML

## 2017-07-20 PROCEDURE — 99213 OFFICE O/P EST LOW 20 MIN: CPT | Mod: S$GLB,,, | Performed by: PHYSICIAN ASSISTANT

## 2017-07-20 PROCEDURE — 99999 PR PBB SHADOW E&M-EST. PATIENT-LVL IV: CPT | Mod: PBBFAC,,, | Performed by: PHYSICIAN ASSISTANT

## 2017-07-20 PROCEDURE — 84443 ASSAY THYROID STIM HORMONE: CPT

## 2017-07-20 PROCEDURE — 36415 COLL VENOUS BLD VENIPUNCTURE: CPT | Mod: PO

## 2017-07-20 NOTE — PROGRESS NOTES
Subjective:       Patient ID: Alyssa Urena is a 26 y.o. female.    Chief Complaint: Mood Swings    26 year old female c/o mood swings X couple weeks. Pt does not have a PCP. She reports intermittent mood swings from feeling happy to feeling very sad and crying. She reports she does not know why they are happening and reports no trauma or recent changes in her life. No new stressors. She denies any SI/HI. She reports she lives with her parents and they are supportive. She reports bipolar disorder runs in her family and she is concerned she may have this. She is taking Effexor daily. She also had chest pain with associated spinning dizziness about one month ago and was seen by a different provider at that time. EKG and CXR were normal and TSH was slightly elevated - FT4 normal. She reports early CAD family history and has not seen a cardiologist. She reports chest pain (which occurred randomly) seems to have resolved and she denies any current CP. She has taken Antivert for her dizziness with relief. She reports no palpitations, fever, chills, cough, edema, exertional sxs, abd pain, chance of pregnancy, N/V, diaphoresis, sxs of infection, or other medical complaints. She reports she does not smoke.       Review of Systems   Constitutional: Negative for chills and fever.   HENT: Negative for congestion and sore throat.    Respiratory: Negative for cough and shortness of breath.    Cardiovascular: Negative for palpitations and leg swelling.   Gastrointestinal: Negative for abdominal pain, nausea and vomiting.   Genitourinary: Negative for difficulty urinating, dysuria, frequency and urgency.   Skin: Negative for rash.   Neurological: Positive for dizziness. Negative for syncope, speech difficulty, weakness, numbness and headaches.   Psychiatric/Behavioral: Negative for confusion.       Objective:       Vitals:    07/20/17 0845   BP: 122/78   Pulse: (!) 136   Temp: 97.1 °F (36.2 °C)   TempSrc: Tympanic  "  SpO2: 99%   Weight: 84.9 kg (187 lb 2.7 oz)   Height: 5' 5" (1.651 m)     Physical Exam   Constitutional: She is oriented to person, place, and time. She appears well-developed and well-nourished. No distress.   HENT:   Head: Normocephalic and atraumatic.   Right Ear: Tympanic membrane and ear canal normal.   Left Ear: Tympanic membrane and ear canal normal.   Eyes: EOM are normal. No scleral icterus.   Neck: Neck supple.   Cardiovascular: Normal rate and regular rhythm.    Pt tachycardic upon triage - resolved by the time of her physical exam; asymptomatic   Pulmonary/Chest: Effort normal and breath sounds normal. No respiratory distress. She has no decreased breath sounds. She has no wheezes. She has no rhonchi. She has no rales.   Abdominal: Soft. Bowel sounds are normal. She exhibits no distension and no mass. There is no tenderness. There is no rigidity, no rebound, no guarding and no CVA tenderness.   Musculoskeletal: Normal range of motion. She exhibits no edema.   Neurological: She is alert and oriented to person, place, and time. No cranial nerve deficit.   Skin: Skin is warm and dry. No rash noted.   Psychiatric: She has a normal mood and affect. Her speech is normal and behavior is normal. Thought content normal.       Component      Latest Ref Rng & Units 6/20/2017   WBC      3.90 - 12.70 K/uL 4.48   RBC      4.00 - 5.40 M/uL 4.21   Hemoglobin      12.0 - 16.0 g/dL 12.7   Hematocrit      37.0 - 48.5 % 39.1   MCV      82 - 98 fL 93   MCH      27.0 - 31.0 pg 30.2   MCHC      32.0 - 36.0 % 32.5   RDW      11.5 - 14.5 % 11.9   Platelets      150 - 350 K/uL 191   MPV      9.2 - 12.9 fL 11.1   Gran #      1.8 - 7.7 K/uL 2.1   Lymph #      1.0 - 4.8 K/uL 1.9   Mono #      0.3 - 1.0 K/uL 0.3   Eos #      0.0 - 0.5 K/uL 0.1   Baso #      0.00 - 0.20 K/uL 0.05   Gran%      38.0 - 73.0 % 47.1   Lymph%      18.0 - 48.0 % 42.9   Mono%      4.0 - 15.0 % 7.6   Eosinophil%      0.0 - 8.0 % 1.3   Basophil%      0.0 - " 1.9 % 1.1   Differential Method       Automated   Sodium      136 - 145 mmol/L 138   Potassium      3.5 - 5.1 mmol/L 3.8   Chloride      95 - 110 mmol/L 106   CO2      23 - 29 mmol/L 24   Glucose      70 - 110 mg/dL 91   BUN, Bld      6 - 20 mg/dL 8   Creatinine      0.5 - 1.4 mg/dL 0.9   Calcium      8.7 - 10.5 mg/dL 9.3   Total Protein      6.0 - 8.4 g/dL 8.1   Albumin      3.5 - 5.2 g/dL 3.8   Total Bilirubin      0.1 - 1.0 mg/dL 0.3   Alkaline Phosphatase      55 - 135 U/L 47 (L)   AST      10 - 40 U/L 11   ALT      10 - 44 U/L 10   Anion Gap      8 - 16 mmol/L 8   eGFR if African American      >60 mL/min/1.73 m:2 >60   eGFR if non African American      >60 mL/min/1.73 m:2 >60   TSH      0.400 - 4.000 uIU/mL 4.222 (H)   Free T4      0.71 - 1.51 ng/dL 1.07       Assessment:       1. Mood swings    2. Family history of bipolar disorder    3. Family history of early CAD    4. Dizziness    5. Subclinical hypothyroidism        Plan:         Alyssa was seen today for mood swings.    Diagnoses and all orders for this visit:    Family history of early CAD  -     Ambulatory referral to Cardiology    Family history of bipolar disorder  -     Ambulatory referral to Psychiatry    Mood swings  -     Ambulatory referral to Psychiatry    Subclinical hypothyroidism  -     TSH; Future    Dizziness  -     TSH; Future    Spoke with Dr. Cortés today - he reports he will put patient on waiting list and will try to see pt as soon as he has a cancellation on his schedule. Pt informed of this and also recommend she go immediately to the ER if sxs worsen before then. She denies any SI/HI and reports having her parents at home with her.   F/u / est care with PCP for health management.

## 2017-07-24 ENCOUNTER — OFFICE VISIT (OUTPATIENT)
Dept: PSYCHIATRY | Facility: CLINIC | Age: 27
End: 2017-07-24
Payer: COMMERCIAL

## 2017-07-24 DIAGNOSIS — F31.32 BIPOLAR AFFECTIVE DISORDER, CURRENTLY DEPRESSED, MODERATE: Primary | ICD-10-CM

## 2017-07-24 DIAGNOSIS — F32.A DEPRESSION, UNSPECIFIED DEPRESSION TYPE: ICD-10-CM

## 2017-07-24 PROCEDURE — 90792 PSYCH DIAG EVAL W/MED SRVCS: CPT | Mod: S$GLB,,, | Performed by: PSYCHIATRY & NEUROLOGY

## 2017-07-24 RX ORDER — QUETIAPINE FUMARATE 100 MG/1
TABLET, FILM COATED ORAL
Qty: 45 TABLET | Refills: 2 | Status: SHIPPED | OUTPATIENT
Start: 2017-07-24 | End: 2017-08-24 | Stop reason: SDUPTHER

## 2017-07-24 RX ORDER — VENLAFAXINE HYDROCHLORIDE 75 MG/1
150 CAPSULE, EXTENDED RELEASE ORAL DAILY
Qty: 60 CAPSULE | Refills: 2 | Status: SHIPPED | OUTPATIENT
Start: 2017-07-24 | End: 2017-09-25 | Stop reason: SDUPTHER

## 2017-07-24 NOTE — PROGRESS NOTES
Outpatient Psychiatry Initial Visit (MD/NP)    7/24/2017    Alyssa Urena, a 26 y.o. female, presenting for initial evaluation visit. Met with patient.    Reason for Encounter: Referred by BRIDGETTE Cruz. Patient complains of mood lability (depression, anxiety)    History of Present Illness: 27 y/o F referred by PA for mood lability. First presented to [facility] in February:     3 week complaint of stress and anxiety at work... states that her job they're understaffed and she feels overworked most of the time... anxiety is intermittent moderate to severe intensity feeling of being overwhelmed. She states sometimes she has to just go to a room by herself... associated trouble sleeping stating that she wakes up many times during the night wondering about job duties. She says she is also getting an abbreviated amount of sleep since she usually has been about 10 or 11 PM and wakes up almost every morning at 3 AM.    Was started on venlafaxine. She requested and received an increased dose in March. She had an evaluation for unexplained CP and dizziness in June (workup was unrevealing).     Represented in July for ongoing mood symptoms. From BRIDGETTE Cruz's note (highlights mine):     26 year old female c/o mood swings X couple weeks. Pt does not have a PCP. She reports intermittent mood swings from feeling happy to feeling very sad and crying. She reports she does not know why they are happening and reports no trauma or recent changes in her life. No new stressors. She denies any SI/HI. She reports she lives with her parents and they are supportive. She reports bipolar disorder runs in her family and she is concerned she may have this. She is taking Effexor daily. She also had chest pain with associated spinning dizziness about one month ago and was seen by a different provider at that time. EKG and CXR were normal and TSH was slightly elevated - FT4 normal. She reports early CAD family history and has not seen a  "cardiologist. She reports chest pain (which occurred randomly) seems to have resolved and she denies any current CP. She has taken Antivert for her dizziness with relief. She reports no palpitations, fever, chills, cough, edema, exertional sxs, abd pain, chance of pregnancy, N/V, diaphoresis, sxs of infection, or other medical complaints. She reports she does not smoke.     Confirms this hx today, reports "my mood swings are crazy". "I go from super happy to super depressed and I don't know why." Experiences "dark thoughts" including suicidal thoughts & fantasies, but no plans or intent. Denies new stressors identified to trigger moods. Denies episodes prior to recent months. Moods initially swung between euthymic, sad without provocation for weeks but then between euthymic, anxious and sad, and most recently have began to be mostly irritable with tinge of anxiety, featuring frequent racing thoughts whitout pressured speech. Drinks daily - "it calms me". More irritable without it, relaxed with it. No withdrawal symptoms on discontinuation. No tolerance. Bottle of wine daily x 1 year. Rare beer or liquor. Wasn't originally to bring self relief. Had one hangover. No morning alcohol. None during workday. No illicits. No prescription misuse.     PsychHx: as above; denies otherwise.    MedHx: Heart starts racing, short of breath, crying spells. Triggered by stress. Doesn't happen at home. Never happens for no reason at all.   Meds: effexor since February or March - sebas better with anxiety. "not a complete breakdown".   antivert - workup with PCP.   FamHx: GM - ; no substance abuse problems.   Fam, SocHx: reviewed. bipolar/scz, aunt bipolar disorder. Works as medical assistant for orthopedic surgeons. Takes a lot of the messages.    Review Of Systems:     GENERAL:  No weight gain or loss  SKIN:  No rashes or lacerations  HEAD:  No headaches  EYES:  No exophthalmos, jaundice or blindness  EARS:  No dizziness, tinnitus or " hearing loss  NOSE:  No changes in smell  MOUTH & THROAT:  No dyskinetic movements or obvious goiter  CHEST:  No shortness of breath, hyperventilation or cough  CARDIOVASCULAR:  No tachycardia or chest pain  ABDOMEN:  No nausea, vomiting, pain, constipation or diarrhea  URINARY:  No frequency, dysuria or sexual dysfunction  ENDOCRINE:  No polydipsia, polyuria  MUSCULOSKELETAL:  No pain or stiffness of the joints  NEUROLOGIC:  No weakness, sensory changes, seizures, confusion, memory loss, tremor or other abnormal movements      Current Evaluation:     Nutritional Screening: Considering the patient's height and weight, medications, medical history and preferences, should a referral be made to the dietitian? no    Constitutional  Vitals:  Most recent vital signs, dated less than 90 days prior to this appointment, were not reviewed.    There were no vitals filed for this visit.     General:  unremarkable, age appropriate     Musculoskeletal  Muscle Strength/Tone:  no dystonia, no tremor   Gait & Station:  non-ataxic     Psychiatric  Appearance: casually dressed & groomed;   Behavior: calm,   Cooperation: cooperative with assessment  Speech: normal rate, volume, tone  Thought Process: linear, goal-directed  Thought Content: No suicidal or homicidal ideation; no delusions  Affect: anxious  Mood: irritable  Perceptions: No auditory or visual hallucinations  Level of Consciousness: alert throughout interview  Insight: fair  Cognition: Oriented to person, place, time, & situation  Memory: no apparent deficits to general clinical interview; not formally assessed  Attention/Concentration: no apparent deficits to general clinical interview; not formally assessed  Fund of Knowledge: average by vocabulary/education    Laboratory Data  Lab Visit on 07/20/2017   Component Date Value Ref Range Status    TSH 07/20/2017 1.467  0.400 - 4.000 uIU/mL Final     Medications  Outpatient Encounter Prescriptions as of 7/24/2017   Medication  Sig Dispense Refill    venlafaxine (EFFEXOR-XR) 75 MG 24 hr capsule Take 1 capsule (75 mg total) by mouth once daily. 30 capsule 11     No facility-administered encounter medications on file as of 7/24/2017.      Assessment - Diagnosis - Goals:     Impression: Greater than 6 months of intermittent mood episodes starting with anxiety but later featuring prolonged considerably lability, low moods, suicidal thoughts, irritability. Unclear current benefit from venlafaxine, but no worse.      Dx: bipolar 2 disorder    Treatment Goals:  Specify outcomes written in observable, behavioral terms:   Decrease mood lability, foster euthymia by self-report, questionnaires    Treatment Plan/Recommendations:   · Quetiapine for mood stabilization. Continue venlafaxine. Discussed risks, benefits, and alternatives to treatment plan documented above with patient. I answered all patient questions related to this plan and patient expressed understanding and agreement.   · 2 months    Return to Clinic: 2 months    Counseling time: 10 minutes  Total time: 50 minutes    HIMA Guillen MD

## 2017-08-18 ENCOUNTER — OFFICE VISIT (OUTPATIENT)
Dept: CARDIOLOGY | Facility: CLINIC | Age: 27
End: 2017-08-18
Payer: COMMERCIAL

## 2017-08-18 VITALS
SYSTOLIC BLOOD PRESSURE: 124 MMHG | DIASTOLIC BLOOD PRESSURE: 80 MMHG | HEART RATE: 76 BPM | HEIGHT: 65 IN | WEIGHT: 183 LBS | BODY MASS INDEX: 30.49 KG/M2

## 2017-08-18 DIAGNOSIS — R07.9 CHEST PAIN SYNDROME: Primary | ICD-10-CM

## 2017-08-18 DIAGNOSIS — Z82.49 FH: CAD (CORONARY ARTERY DISEASE): ICD-10-CM

## 2017-08-18 PROCEDURE — 99999 PR PBB SHADOW E&M-EST. PATIENT-LVL III: CPT | Mod: PBBFAC,,, | Performed by: INTERNAL MEDICINE

## 2017-08-18 PROCEDURE — 3008F BODY MASS INDEX DOCD: CPT | Mod: S$GLB,,, | Performed by: INTERNAL MEDICINE

## 2017-08-18 PROCEDURE — 99203 OFFICE O/P NEW LOW 30 MIN: CPT | Mod: S$GLB,,, | Performed by: INTERNAL MEDICINE

## 2017-08-18 NOTE — PROGRESS NOTES
Subjective:   Patient ID:  Alyssa Urena is a 26 y.o. female who presents for evaluation of No chief complaint on file.      HPI  A 27 yo female is self referred for  chest pain she ahs a very strong fh for premature heart disease. She has been complaining of intermittent rt sided sharp chest pain short lived has associated numbness in rt arm has no palpitation no syncope no real triggers. It is not associated with respiration. She exercises and has no symptoms. She is concerned because of family history. She eats fried food fast food salty fried cokes  She uses effexor and seroquel to sleep. Her ekg is normal. She has no palpitations no syncope near syncope   Past Medical History:   Diagnosis Date    Chest pain syndrome 8/18/2017    FH: CAD (coronary artery disease) 8/18/2017       Past Surgical History:   Procedure Laterality Date    OOPHORECTOMY  2014       Social History   Substance Use Topics    Smoking status: Never Smoker    Smokeless tobacco: Never Used    Alcohol use No       Family History   Problem Relation Age of Onset    Cataracts Maternal Grandmother     Diabetes Maternal Grandmother     Cataracts Maternal Grandfather     Hypertension Mother     Coronary artery disease Father      MI age 40s    Coronary artery disease Paternal Uncle      age 30s       Current Outpatient Prescriptions   Medication Sig    quetiapine (SEROQUEL) 100 MG Tab Take 1/2 tablet at bedtime for 3 days then 1 tablet at bedtime for 3 days then 1 and 1/2 tablet at bedtime for 3 days    venlafaxine (EFFEXOR-XR) 75 MG 24 hr capsule Take 2 capsules (150 mg total) by mouth once daily.     No current facility-administered medications for this visit.      Current Outpatient Prescriptions on File Prior to Visit   Medication Sig    quetiapine (SEROQUEL) 100 MG Tab Take 1/2 tablet at bedtime for 3 days then 1 tablet at bedtime for 3 days then 1 and 1/2 tablet at bedtime for 3 days    venlafaxine (EFFEXOR-XR) 75 MG  24 hr capsule Take 2 capsules (150 mg total) by mouth once daily.     No current facility-administered medications on file prior to visit.        Review of patient's allergies indicates:   Allergen Reactions    Peanut Hives       Review of Systems   Constitution: Negative for diaphoresis, weakness, malaise/fatigue and weight gain.   HENT: Negative for headaches and hoarse voice.    Eyes: Negative for double vision and visual disturbance.   Cardiovascular: Positive for chest pain. Negative for claudication, cyanosis, dyspnea on exertion, irregular heartbeat, leg swelling, near-syncope, orthopnea, palpitations, paroxysmal nocturnal dyspnea and syncope.   Respiratory: Negative for cough, hemoptysis, shortness of breath and snoring.    Hematologic/Lymphatic: Negative for bleeding problem. Does not bruise/bleed easily.   Skin: Negative for color change and poor wound healing.   Musculoskeletal: Negative for muscle cramps, muscle weakness and myalgias.   Gastrointestinal: Negative for bloating, abdominal pain, change in bowel habit, diarrhea, heartburn, hematemesis, hematochezia, melena and nausea.   Neurological: Negative for excessive daytime sleepiness, dizziness, light-headedness, loss of balance and numbness.   Psychiatric/Behavioral: Negative for memory loss. The patient has insomnia.    Allergic/Immunologic: Negative for hives.       Objective:   Physical Exam   Constitutional: She is oriented to person, place, and time. She appears well-developed and well-nourished. She does not appear ill. No distress.   HENT:   Head: Normocephalic and atraumatic.   Eyes: EOM are normal. Pupils are equal, round, and reactive to light. No scleral icterus.   Neck: Normal range of motion. Neck supple. Normal carotid pulses, no hepatojugular reflux and no JVD present. Carotid bruit is not present. No tracheal deviation present. No thyromegaly present.   Cardiovascular: Normal rate, regular rhythm, normal heart sounds and normal  "pulses.  Exam reveals no gallop and no friction rub.    No murmur heard.  Pulmonary/Chest: Effort normal and breath sounds normal. No respiratory distress. She has no wheezes. She has no rhonchi. She has no rales. She exhibits no tenderness.   Abdominal: Soft. Normal appearance, normal aorta and bowel sounds are normal. She exhibits no abdominal bruit, no ascites and no pulsatile midline mass. There is no hepatomegaly. There is no tenderness.   Musculoskeletal: She exhibits no edema.        Right shoulder: She exhibits no deformity.   Neurological: She is alert and oriented to person, place, and time. She has normal strength. No cranial nerve deficit. Coordination normal.   Skin: Skin is warm and dry. No rash noted. She is not diaphoretic. No cyanosis or erythema. Nails show no clubbing.   Psychiatric: She has a normal mood and affect. Her speech is normal and behavior is normal.   Nursing note and vitals reviewed.    Vitals:    08/18/17 1316   BP: 124/80   BP Location: Right arm   Pulse: 76   Weight: 83 kg (183 lb)   Height: 5' 5" (1.651 m)     No results found for: CHOL  No results found for: HDL  No results found for: LDLCALC  No results found for: TRIG  No results found for: CHOLHDL    Chemistry        Component Value Date/Time     06/20/2017 0918    K 3.8 06/20/2017 0918     06/20/2017 0918    CO2 24 06/20/2017 0918    BUN 8 06/20/2017 0918    CREATININE 0.9 06/20/2017 0918    GLU 91 06/20/2017 0918        Component Value Date/Time    CALCIUM 9.3 06/20/2017 0918    ALKPHOS 47 (L) 06/20/2017 0918    AST 11 06/20/2017 0918    ALT 10 06/20/2017 0918    BILITOT 0.3 06/20/2017 0918    ESTGFRAFRICA >60 06/20/2017 0918    EGFRNONAA >60 06/20/2017 0918          Lab Results   Component Value Date    TSH 1.467 07/20/2017     No results found for: INR, PROTIME  Lab Results   Component Value Date    WBC 4.48 06/20/2017    HGB 12.7 06/20/2017    HCT 39.1 06/20/2017    MCV 93 06/20/2017     06/20/2017 "     BNP  @LABRCNTIP(BNP,BNPTRIAGEBLO)@  CrCl cannot be calculated (Patient's most recent sCr result is older than the maximum 7 days allowed.).  Assessment:     1. Chest pain syndrome    2. FH: CAD (coronary artery disease)      Atypical chest pain probably musculoskeletal.  Plan:   Reassure   ett   Lipids   Get her lab work from pcp.  avoid fried fatty food

## 2017-08-18 NOTE — LETTER
August 18, 2017      Fatou Ortiz MD  [address] [facility]  [place] [address]           [facility] - Cardiology  [address] [place] [address]  Phone: [phone]  Fax: [phone]          Patient: Alyssa Urena   MR Number: [id number]   YOB: 1990   Date of Visit: 8/18/2017       Dear Dr. Fatou Ortiz:    Thank you for referring Alyssa Urena to me for evaluation. Attached you will find relevant portions of my assessment and plan of care.    If you have questions, please do not hesitate to call me. I look forward to following Alyssa Urena along with you.    Sincerely,    Stephania Lim MD    Enclosure  CC:  No Recipients    If you would like to receive this communication electronically, please contact [email or web address] or [phone] to request more information on [employer] access.    For providers and/or their staff who would like to refer a patient to [facility], please contact us through our one-stop-shop provider referral line, [facility], at [phone].    If you feel you have received this communication in error or would no longer like to receive these types of communications, please e-mail [email or web address]

## 2017-08-22 ENCOUNTER — TELEPHONE (OUTPATIENT)
Dept: CARDIOLOGY | Facility: CLINIC | Age: 27
End: 2017-08-22

## 2017-08-22 ENCOUNTER — LAB VISIT (OUTPATIENT)
Dept: LAB | Facility: HOSPITAL | Age: 27
End: 2017-08-22
Attending: INTERNAL MEDICINE
Payer: COMMERCIAL

## 2017-08-22 DIAGNOSIS — R07.9 CHEST PAIN SYNDROME: ICD-10-CM

## 2017-08-22 DIAGNOSIS — Z82.49 FH: CAD (CORONARY ARTERY DISEASE): ICD-10-CM

## 2017-08-22 LAB
CHOLEST/HDLC SERPL: 2.9 {RATIO}
HDL/CHOLESTEROL RATIO: 35 %
HDLC SERPL-MCNC: 137 MG/DL
HDLC SERPL-MCNC: 48 MG/DL
LDLC SERPL CALC-MCNC: 78.8 MG/DL
NONHDLC SERPL-MCNC: 89 MG/DL
TRIGL SERPL-MCNC: 51 MG/DL

## 2017-08-22 PROCEDURE — 80061 LIPID PANEL: CPT

## 2017-08-22 PROCEDURE — 36415 COLL VENOUS BLD VENIPUNCTURE: CPT | Mod: PO

## 2017-08-24 RX ORDER — QUETIAPINE FUMARATE 100 MG/1
TABLET, FILM COATED ORAL
Qty: 2 TABLET | Refills: 0 | Status: SHIPPED | OUTPATIENT
Start: 2017-08-24 | End: 2017-09-25 | Stop reason: ALTCHOICE

## 2017-08-25 ENCOUNTER — TELEPHONE (OUTPATIENT)
Dept: CARDIOLOGY | Facility: CLINIC | Age: 27
End: 2017-08-25

## 2017-09-08 ENCOUNTER — OFFICE VISIT (OUTPATIENT)
Dept: OBSTETRICS AND GYNECOLOGY | Facility: CLINIC | Age: 27
End: 2017-09-08
Payer: COMMERCIAL

## 2017-09-08 VITALS
SYSTOLIC BLOOD PRESSURE: 118 MMHG | DIASTOLIC BLOOD PRESSURE: 80 MMHG | HEIGHT: 65 IN | BODY MASS INDEX: 30.93 KG/M2 | WEIGHT: 185.63 LBS

## 2017-09-08 DIAGNOSIS — Z30.431 IUD CHECK UP: Primary | ICD-10-CM

## 2017-09-08 DIAGNOSIS — R87.615 UNSATISFACTORY CERVICAL PAPANICOLAOU SMEAR: ICD-10-CM

## 2017-09-08 PROCEDURE — 3008F BODY MASS INDEX DOCD: CPT | Mod: S$GLB,,, | Performed by: OBSTETRICS & GYNECOLOGY

## 2017-09-08 PROCEDURE — 88141 CYTOPATH C/V INTERPRET: CPT | Mod: ,,, | Performed by: PATHOLOGY

## 2017-09-08 PROCEDURE — 88175 CYTOPATH C/V AUTO FLUID REDO: CPT | Performed by: PATHOLOGY

## 2017-09-08 PROCEDURE — 99212 OFFICE O/P EST SF 10 MIN: CPT | Mod: S$GLB,,, | Performed by: OBSTETRICS & GYNECOLOGY

## 2017-09-08 PROCEDURE — 99999 PR PBB SHADOW E&M-EST. PATIENT-LVL III: CPT | Mod: PBBFAC,,, | Performed by: OBSTETRICS & GYNECOLOGY

## 2017-09-08 NOTE — PROGRESS NOTES
Subjective:       Patient ID: Alyssa Urena is a 26 y.o. female.    Chief Complaint:  Contraception      History of Present Illness  HPI  Pt is s/p Mirena insertion on 6/29/17 and is here for string check.  Pt reports no complaints.  Doing well.  Happy with results.  Last pap Insufficient in 2017.    GYN & OB History  No LMP recorded. Patient is not currently having periods (Reason: Birth Control).   Date of Last Pap: 5/4/2017    OB History   No data available       Review of Systems  Review of Systems   Constitutional: Negative for activity change, appetite change, fatigue, fever and unexpected weight change.   Respiratory: Negative for shortness of breath.    Cardiovascular: Negative for chest pain, palpitations and leg swelling.   Gastrointestinal: Negative for abdominal pain, constipation, diarrhea, nausea and vomiting.   Genitourinary: Negative for dyspareunia, dysuria, frequency, genital sores, hematuria, menorrhagia, menstrual problem, pelvic pain, vaginal discharge, vaginal pain, dysmenorrhea and vaginal odor.   Musculoskeletal: Negative for back pain.   Neurological: Negative for syncope and headaches.           Objective:    Physical Exam:   Constitutional: She is oriented to person, place, and time. She appears well-developed and well-nourished. No distress.       Cardiovascular: Normal rate and regular rhythm.     Pulmonary/Chest: Effort normal.        Abdominal: Soft. Bowel sounds are normal. She exhibits no distension. There is no tenderness.     Genitourinary: Vagina normal and uterus normal. Pelvic exam was performed with patient supine. There is no rash, tenderness, lesion or injury on the right labia. There is no rash, tenderness, lesion or injury on the left labia. Uterus is not deviated, not enlarged and not tender. Cervix is normal. Right adnexum displays no mass, no tenderness and no fullness. Left adnexum displays no mass, no tenderness and no fullness. No erythema, tenderness  or bleeding in the vagina. No foreign body in the vagina. No signs of injury around the vagina. No vaginal discharge found. Cervix exhibits no motion tenderness, no discharge and no friability. Additional cervical findings: IUD strings visualized              Neurological: She is alert and oriented to person, place, and time.     Psychiatric: She has a normal mood and affect. Her behavior is normal. Thought content normal.          Assessment:        1. IUD check up    2. Unsatisfactory cervical Papanicolaou smear             Plan:      IUD check up  -   Strings visualized and IUD appears in place.  Happy with results.    Unsatisfactory cervical Papanicolaou smear  -     Liquid-based pap smear, screening  -     Pt was counseled on cervical/vaginal screening guidelines and recommendations.  If today's pap smear result is negative, next pap smear will be due in 2020.  -     Follow up with PCP for routine health maintenance needs.      Return for annual exam.

## 2017-09-18 ENCOUNTER — PATIENT MESSAGE (OUTPATIENT)
Dept: OBSTETRICS AND GYNECOLOGY | Facility: CLINIC | Age: 27
End: 2017-09-18

## 2017-09-18 DIAGNOSIS — R85.611: Primary | ICD-10-CM

## 2017-09-20 ENCOUNTER — TELEPHONE (OUTPATIENT)
Dept: OBSTETRICS AND GYNECOLOGY | Facility: CLINIC | Age: 27
End: 2017-09-20

## 2017-09-20 PROBLEM — R85.611: Status: ACTIVE | Noted: 2017-09-20

## 2017-09-20 NOTE — TELEPHONE ENCOUNTER
Spoke with patient regarding Colposcopy procedure and [facility]. Patient verbalized understanding.

## 2017-09-20 NOTE — TELEPHONE ENCOUNTER
Called pt but no answer.  Left message to return call.  Reason for call: discuss pap results ([facility]).  Recommend colposcopy.  Will have nurse contact pt to inform and schedule.

## 2017-09-25 ENCOUNTER — OFFICE VISIT (OUTPATIENT)
Dept: PSYCHIATRY | Facility: CLINIC | Age: 27
End: 2017-09-25
Payer: COMMERCIAL

## 2017-09-25 DIAGNOSIS — F31.81 BIPOLAR 2 DISORDER: ICD-10-CM

## 2017-09-25 DIAGNOSIS — F32.A DEPRESSION, UNSPECIFIED DEPRESSION TYPE: ICD-10-CM

## 2017-09-25 PROCEDURE — 3008F BODY MASS INDEX DOCD: CPT | Mod: S$GLB,,, | Performed by: PSYCHIATRY & NEUROLOGY

## 2017-09-25 PROCEDURE — 99213 OFFICE O/P EST LOW 20 MIN: CPT | Mod: S$GLB,,, | Performed by: PSYCHIATRY & NEUROLOGY

## 2017-09-25 RX ORDER — VENLAFAXINE HYDROCHLORIDE 75 MG/1
150 CAPSULE, EXTENDED RELEASE ORAL DAILY
Qty: 60 CAPSULE | Refills: 1 | Status: SHIPPED | OUTPATIENT
Start: 2017-09-25 | End: 2017-12-05 | Stop reason: SDUPTHER

## 2017-09-25 RX ORDER — QUETIAPINE FUMARATE 100 MG/1
100 TABLET, FILM COATED ORAL NIGHTLY
Qty: 30 TABLET | Refills: 1 | Status: SHIPPED | OUTPATIENT
Start: 2017-09-25 | End: 2017-12-05 | Stop reason: SINTOL

## 2017-09-26 PROBLEM — F31.81 BIPOLAR 2 DISORDER: Status: ACTIVE | Noted: 2017-09-26

## 2017-10-26 ENCOUNTER — PATIENT MESSAGE (OUTPATIENT)
Dept: INTERNAL MEDICINE | Facility: CLINIC | Age: 27
End: 2017-10-26

## 2017-10-31 ENCOUNTER — OFFICE VISIT (OUTPATIENT)
Dept: INTERNAL MEDICINE | Facility: CLINIC | Age: 27
End: 2017-10-31
Payer: COMMERCIAL

## 2017-10-31 VITALS
SYSTOLIC BLOOD PRESSURE: 114 MMHG | DIASTOLIC BLOOD PRESSURE: 86 MMHG | OXYGEN SATURATION: 99 % | HEART RATE: 81 BPM | BODY MASS INDEX: 33.16 KG/M2 | HEIGHT: 64 IN | TEMPERATURE: 98 F | WEIGHT: 194.25 LBS

## 2017-10-31 DIAGNOSIS — Z00.00 ANNUAL PHYSICAL EXAM: Primary | ICD-10-CM

## 2017-10-31 DIAGNOSIS — Z23 NEED FOR DIPHTHERIA-TETANUS-PERTUSSIS (TDAP) VACCINE: ICD-10-CM

## 2017-10-31 DIAGNOSIS — F31.81 BIPOLAR 2 DISORDER: Chronic | ICD-10-CM

## 2017-10-31 DIAGNOSIS — E66.9 OBESITY (BMI 30.0-34.9): ICD-10-CM

## 2017-10-31 PROBLEM — R07.9 CHEST PAIN SYNDROME: Chronic | Status: ACTIVE | Noted: 2017-08-18

## 2017-10-31 PROCEDURE — 99395 PREV VISIT EST AGE 18-39: CPT | Mod: 25,S$GLB,, | Performed by: FAMILY MEDICINE

## 2017-10-31 PROCEDURE — 90715 TDAP VACCINE 7 YRS/> IM: CPT | Mod: S$GLB,,, | Performed by: FAMILY MEDICINE

## 2017-10-31 PROCEDURE — 99999 PR PBB SHADOW E&M-EST. PATIENT-LVL III: CPT | Mod: PBBFAC,,, | Performed by: FAMILY MEDICINE

## 2017-10-31 PROCEDURE — 90471 IMMUNIZATION ADMIN: CPT | Mod: S$GLB,,, | Performed by: FAMILY MEDICINE

## 2017-10-31 NOTE — PROGRESS NOTES
Subjective:   Patient ID: Alyssa Urena is a 26 y.o. female.  Chief Complaint:  Establish Care      Presents to establish care and follow-up on possible thyroid problem.  Medical history for bipolar disorder.  Sees psychiatry.  On Seroquel and Effexor.  Working well.  June 2017 with CBC, CMP normal.  TSH elevated mildly with normal T4.  Repeat testing July with TSH normal.  Lipid panel done July with LDL 75 and low overall 10 year risk.  Compla weight gain.  Otherwise doing well.  Had flu vaccine through work.  Employed at [facility].  Needs tetanus booster.  ins of weight gain since starting psychiatric medicines.  Increased knee pain with        Current Outpatient Prescriptions:     quetiapine (SEROQUEL) 100 MG Tab, Take 1 tablet (100 mg total) by mouth every evening., Disp: 30 tablet, Rfl: 1    venlafaxine (EFFEXOR-XR) 75 MG 24 hr capsule, Take 2 capsules (150 mg total) by mouth once daily., Disp: 60 capsule, Rfl: 1     Review of Systems   Constitutional: Positive for unexpected weight change. Negative for activity change, chills, fatigue and fever.   HENT: Negative for congestion, dental problem, ear pain, hearing loss, postnasal drip, rhinorrhea, sinus pressure, sore throat and trouble swallowing.    Eyes: Negative for discharge and visual disturbance.   Respiratory: Negative for cough, chest tightness, shortness of breath and wheezing.    Cardiovascular: Negative for chest pain, palpitations and leg swelling.   Gastrointestinal: Negative for abdominal pain, blood in stool, constipation, diarrhea, nausea and vomiting.   Endocrine: Negative for polydipsia, polyphagia and polyuria.   Genitourinary: Negative for difficulty urinating, dysuria, flank pain, hematuria, menstrual problem and pelvic pain.   Musculoskeletal: Positive for arthralgias and joint swelling. Negative for myalgias and neck pain.   Skin: Negative for rash.   Neurological: Negative for dizziness, syncope, weakness, light-headedness  "and headaches.   Hematological: Negative for adenopathy.   Psychiatric/Behavioral: Positive for dysphoric mood. Negative for confusion.     Objective:   /86 (BP Location: Right arm, Patient Position: Sitting, BP Method: Large (Manual))   Pulse 81   Temp 97.6 °F (36.4 °C) (Tympanic)   Ht 5' 4" (1.626 m)   Wt 88.1 kg (194 lb 3.6 oz)   SpO2 99%   BMI 33.34 kg/m²     Physical Exam   Constitutional: She is oriented to person, place, and time. Vital signs are normal. She appears well-developed and well-nourished.   Neck: No JVD present. No thyroid mass and no thyromegaly present.   Cardiovascular: Normal rate, regular rhythm and normal heart sounds.  Exam reveals no gallop and no friction rub.    No murmur heard.  Pulses:       Radial pulses are 2+ on the right side, and 2+ on the left side.   Pulmonary/Chest: Effort normal and breath sounds normal. She has no wheezes. She has no rhonchi. She has no rales.   Abdominal: Soft. She exhibits no distension. There is no tenderness. There is no rebound, no guarding and no CVA tenderness.   Musculoskeletal: Normal range of motion. She exhibits no edema.   Neurological: She is oriented to person, place, and time. She displays a negative Romberg sign. Coordination and gait normal.   Skin: Skin is warm and dry. No rash noted.   Psychiatric: She has a normal mood and affect. Her behavior is normal. Judgment and thought content normal.   Nursing note and vitals reviewed.    Assessment:     1. Annual physical exam    2. Bipolar 2 disorder    3. Obesity (BMI 30.0-34.9)    4. Need for diphtheria-tetanus-pertussis (Tdap) vaccine      Plan:   Annual physical exam  -     (In Office Administered) Tdap Vaccine  Physical exam stable other than obesity.  Discussed last normal TSH.  No additional testing indicated.  Tetanus booster in office today.  Flu up-to-date.  GYN up-to-date.    Bipolar 2 disorder  Stable. continue per psych.    Obesity (BMI 30.0-34.9)  Discussed increased BMI, " Increased health risks, Need for weight loss, Lifestyle modifications.    RCT 9 months for annual physical exam and labs, sooner if needed

## 2017-11-16 DIAGNOSIS — M25.569 KNEE PAIN, UNSPECIFIED CHRONICITY, UNSPECIFIED LATERALITY: Primary | ICD-10-CM

## 2017-11-22 ENCOUNTER — OFFICE VISIT (OUTPATIENT)
Dept: ORTHOPEDICS | Facility: CLINIC | Age: 27
End: 2017-11-22
Payer: COMMERCIAL

## 2017-11-22 ENCOUNTER — HOSPITAL ENCOUNTER (OUTPATIENT)
Dept: RADIOLOGY | Facility: HOSPITAL | Age: 27
Discharge: HOME | End: 2017-11-22
Attending: ORTHOPAEDIC SURGERY
Payer: COMMERCIAL

## 2017-11-22 VITALS
HEIGHT: 64 IN | BODY MASS INDEX: 33.16 KG/M2 | WEIGHT: 194.25 LBS | HEART RATE: 92 BPM | SYSTOLIC BLOOD PRESSURE: 125 MMHG | DIASTOLIC BLOOD PRESSURE: 83 MMHG

## 2017-11-22 DIAGNOSIS — M25.561 ACUTE PAIN OF RIGHT KNEE: Primary | ICD-10-CM

## 2017-11-22 DIAGNOSIS — M25.569 KNEE PAIN, UNSPECIFIED CHRONICITY, UNSPECIFIED LATERALITY: ICD-10-CM

## 2017-11-22 DIAGNOSIS — M22.42 CHONDROMALACIA PATELLAE OF LEFT KNEE: ICD-10-CM

## 2017-11-22 DIAGNOSIS — M22.41 CHONDROMALACIA PATELLAE OF RIGHT KNEE: ICD-10-CM

## 2017-11-22 PROCEDURE — 99203 OFFICE O/P NEW LOW 30 MIN: CPT | Mod: S$GLB,,, | Performed by: ORTHOPAEDIC SURGERY

## 2017-11-22 PROCEDURE — 73564 X-RAY EXAM KNEE 4 OR MORE: CPT | Mod: TC,50,PO

## 2017-11-22 PROCEDURE — 73564 X-RAY EXAM KNEE 4 OR MORE: CPT | Mod: 26,50,, | Performed by: RADIOLOGY

## 2017-11-22 PROCEDURE — 99999 PR PBB SHADOW E&M-EST. PATIENT-LVL III: CPT | Mod: PBBFAC,,, | Performed by: ORTHOPAEDIC SURGERY

## 2017-11-22 NOTE — PROGRESS NOTES
Subjective:     Patient ID: Alyssa Urena is a 26 y.o. female.    Chief Complaint: Pain and Swelling of the Right Knee    Alyssa Urena is a 26 y.o. female anterior and right knee pain.      Swelling   Associated symptoms include joint swelling. Pertinent negatives include no chest pain, fever, numbness or rash.   Knee Pain    The pain is present in the right knee. This is a recurrent problem. The current episode started more than 1 year ago. There has been no history of extremity trauma. The problem occurs intermittently and daily. The problem has been gradually worsening. The quality of the pain is described as aching. The pain is at a severity of 3/10. Associated symptoms include joint swelling. Pertinent negatives include no fever or numbness. The symptoms are aggravated by activity and walking. She has tried OTC pain meds and brace/corset for the symptoms. The treatment provided mild relief. Physical therapy was not tried.      Past Medical History:   Diagnosis Date    Chest pain syndrome 8/18/2017    FH: CAD (coronary artery disease) 8/18/2017     Past Surgical History:   Procedure Laterality Date    OOPHORECTOMY  2014     Family History   Problem Relation Age of Onset    Cataracts Maternal Grandmother     Diabetes Maternal Grandmother     Cataracts Maternal Grandfather     Hypertension Mother     Coronary artery disease Father      MI age 40s    Coronary artery disease Paternal Uncle      age 30s     Social History     Social History    Marital status: Single     Spouse name: N/A    Number of children: N/A    Years of education: N/A     Occupational History    Not on file.     Social History Main Topics    Smoking status: Never Smoker    Smokeless tobacco: Never Used    Alcohol use No    Drug use: No    Sexual activity: Yes     Partners: Male     Birth control/ protection: None     Other Topics Concern    Not on file     Social History Narrative    No narrative on  file     Medication List with Changes/Refills   Current Medications    QUETIAPINE (SEROQUEL) 100 MG TAB    Take 1 tablet (100 mg total) by mouth every evening.    VENLAFAXINE (EFFEXOR-XR) 75 MG 24 HR CAPSULE    Take 2 capsules (150 mg total) by mouth once daily.     Review of patient's allergies indicates:   Allergen Reactions    Peanut Hives     Review of Systems   Constitution: Negative for fever and night sweats.   HENT: Negative for hearing loss.    Eyes: Negative for blurred vision and visual disturbance.   Cardiovascular: Negative for chest pain and leg swelling.   Respiratory: Negative for shortness of breath.    Endocrine: Negative for polyuria.   Hematologic/Lymphatic: Negative for bleeding problem.   Skin: Negative for rash.   Musculoskeletal: Positive for joint pain and joint swelling. Negative for back pain, muscle cramps and muscle weakness.   Gastrointestinal: Negative for melena.   Genitourinary: Negative for hematuria.   Neurological: Negative for loss of balance, numbness and paresthesias.   Psychiatric/Behavioral: Negative for altered mental status.       Objective:   Body mass index is 33.34 kg/m².  Vitals:    11/22/17 0841   BP: 125/83   Pulse: 92       General: Alyssa is well-developed, well-nourished, appears stated age, in no acute distress, alert and oriented to time, place and person.       General    Vitals reviewed.  Constitutional: She is oriented to person, place, and time. She appears well-developed and well-nourished. No distress.   HENT:   Mouth/Throat: No oropharyngeal exudate.   Eyes: Right eye exhibits no discharge. Left eye exhibits no discharge.   Neck: Normal range of motion.   Pulmonary/Chest: Effort normal and breath sounds normal. No respiratory distress.   Neurological: She is alert and oriented to person, place, and time. She has normal reflexes. No cranial nerve deficit. Coordination normal.   Psychiatric: She has a normal mood and affect. Her behavior is normal.  Judgment and thought content normal.     General Musculoskeletal Exam   Gait: normal       Right Knee Exam     Inspection   Erythema: absent  Scars: absent  Swelling: absent  Effusion: effusion  Deformity: deformity  Bruising: absent    Tenderness   The patient is tender to palpation of the medial joint line and patella.    Crepitus   The patient has crepitus of the patella.    Range of Motion   Extension: 0   Flexion: 140     Tests   Meniscus   Tony:  Medial - positive Lateral - negative  Ligament Examination Lachman: normal (-1 to 2mm) PCL-Posterior Drawer: normal (0 to 2mm)     MCL - Valgus: normal (0 to 2mm)  LCL - Varus: normalPivot Shift: normal (Equal)Reverse Pivot Shift: normal (Equal)Dial Test at 30 degrees: normal (< 5 degrees)Dial Test at 90 degrees: normal (< 5 degrees)  Posterior Sag Test: negative  Posterolateral Corner: unstable (>15 degrees difference)  Patella   Patellar Apprehension: negative  Passive Patellar Tilt: neutral  Patellar Tracking: normal  Patellar Glide (quadrants): Lateral - 1   Medial - 2  Q-Angle at 90 degrees: normal  Patellar Grind: positive  J-Sign: none    Other   Meniscal Cyst: absent  Popliteal (Baker's) Cyst: absent  Sensation: normal    Left Knee Exam     Inspection   Erythema: absent  Scars: absent  Swelling: absent  Effusion: absent  Deformity: deformity  Bruising: absent    Tenderness   The patient tender to palpation of the patella.    Crepitus   The patient has crepitus of the patella.    Range of Motion   Extension: 0   Flexion: 140     Tests   Meniscus   Tony:  Medial - negative Lateral - negative  Stability Lachman: normal (-1 to 2mm) PCL-Posterior Drawer: normal (0 to 2mm)  MCL - Valgus: normal (0 to 2mm)  LCL - Varus: normal (0 to 2mm)Pivot Shift: normal (Equal)Reverse Pivot Shift: normal (Equal)Dial Test at 30 degrees: normal (< 5 degrees)Dial Test at 90 degrees: normal (< 5 degrees)  Posterior Sag Test: negative  Posterolateral Corner: unstable (>15  degrees difference)  Patella   Patellar Apprehension: negative  Passive Patellar Tilt: neutral  Patellar Tracking: normal  Patellar Glide (Quadrants): Lateral - 1 Medial - 2  Q-Angle at 90 degrees: normal  Patellar Grind: positive  J-Sign: J sign absent    Other   Meniscal Cyst: absent  Popliteal (Baker's) Cyst: absent  Sensation: normal    Right Hip Exam     Tests   Sonja: negative  Left Hip Exam     Tests   Sonja: negative          Muscle Strength   Right Lower Extremity   Hip Abduction: 5/5   Quadriceps:  4/5   Hamstrin/5   Left Lower Extremity   Hip Abduction: 5/5   Quadriceps:  4/5   Hamstrin/5     Reflexes     Left Side  Quadriceps:  2+  Achilles:  2+    Right Side   Quadriceps:  2+  Achilles:  2+    Vascular Exam     Right Pulses  Dorsalis Pedis:      2+  Posterior Tibial:      2+        Left Pulses  Dorsalis Pedis:      2+  Posterior Tibial:      2+        X-rays including standing, weight bearing AP and flexion bilateral knees, lateral and merchant views ordered and images reviewed by me show:    No fracture, dislocation or other pathology   Medial compartment: no degenerative changes   Lateral compartment: no degenerative changes   Patellofemoral compartment: no degenerative changes. Spurring to inferior patella right side.      Assessment:     Encounter Diagnoses   Name Primary?    Acute pain of right knee Yes    Chondromalacia patellae of left knee     Chondromalacia patellae of right knee         Plan:     1. MRI right knee to evaluate medial meniscus and patellar cartilage    2. Will return to review after MRI. Scope vs injection vs cartilage procedure

## 2017-11-24 ENCOUNTER — HOSPITAL ENCOUNTER (OUTPATIENT)
Dept: RADIOLOGY | Facility: HOSPITAL | Age: 27
Discharge: HOME | End: 2017-11-24
Attending: ORTHOPAEDIC SURGERY
Payer: COMMERCIAL

## 2017-11-24 DIAGNOSIS — M25.561 ACUTE PAIN OF RIGHT KNEE: ICD-10-CM

## 2017-11-24 DIAGNOSIS — M25.561 ACUTE PAIN OF RIGHT KNEE: Primary | ICD-10-CM

## 2017-11-24 PROCEDURE — 73721 MRI JNT OF LWR EXTRE W/O DYE: CPT | Mod: 26,RT,, | Performed by: RADIOLOGY

## 2017-11-24 PROCEDURE — 73721 MRI JNT OF LWR EXTRE W/O DYE: CPT | Mod: TC,PO,RT

## 2017-11-27 ENCOUNTER — PATIENT MESSAGE (OUTPATIENT)
Dept: PSYCHIATRY | Facility: CLINIC | Age: 27
End: 2017-11-27

## 2017-12-05 ENCOUNTER — OFFICE VISIT (OUTPATIENT)
Dept: PSYCHIATRY | Facility: CLINIC | Age: 27
End: 2017-12-05
Payer: COMMERCIAL

## 2017-12-05 DIAGNOSIS — F41.0 PANIC ATTACKS: ICD-10-CM

## 2017-12-05 DIAGNOSIS — F32.A DEPRESSION, UNSPECIFIED DEPRESSION TYPE: ICD-10-CM

## 2017-12-05 DIAGNOSIS — F31.81 BIPOLAR 2 DISORDER: Primary | Chronic | ICD-10-CM

## 2017-12-05 PROCEDURE — 99213 OFFICE O/P EST LOW 20 MIN: CPT | Mod: S$GLB,,, | Performed by: PSYCHIATRY & NEUROLOGY

## 2017-12-05 RX ORDER — VENLAFAXINE HYDROCHLORIDE 75 MG/1
150 CAPSULE, EXTENDED RELEASE ORAL DAILY
Qty: 60 CAPSULE | Refills: 2 | Status: SHIPPED | OUTPATIENT
Start: 2017-12-05 | End: 2018-04-23 | Stop reason: SDUPTHER

## 2017-12-05 RX ORDER — LURASIDONE HYDROCHLORIDE 40 MG/1
40 TABLET, FILM COATED ORAL DAILY
Qty: 30 TABLET | Refills: 2 | Status: SHIPPED | OUTPATIENT
Start: 2017-12-05 | End: 2018-01-09 | Stop reason: ALTCHOICE

## 2017-12-05 RX ORDER — LORAZEPAM 1 MG/1
1 TABLET ORAL DAILY PRN
Qty: 15 TABLET | Refills: 0 | Status: SHIPPED | OUTPATIENT
Start: 2017-12-05 | End: 2019-01-22 | Stop reason: SDUPTHER

## 2017-12-05 NOTE — PROGRESS NOTES
Outpatient Psychiatry Follow-up Visit (MD/NP)    12/5/2017    Alyssa Urena, a 27 y.o. female, presenting for follow-up visit. Met with patient.    Reason for Encounter: follow-up    IntervalHx: Patient seen & interviewed for follow-up, last seen about 2 months ago. Reports moods remain mostly euthymic, though has experienced panic attacks x 2, seemingly unprovoked. Has gained 30 pounds since starting quetiapine. Has been trying to modify diet & exercise but no benefits yet to weight gain. Adherence to both medications has been good. Sedation has been improved with reduced dose. No new concomitant medications.     Background: 27 y/o F referred by PA for mood lability. First presented to [facility] in February: 3 week complaint of stress and anxiety at work... states that her job they're understaffed and she feels overworked most of the time... anxiety is intermittent moderate to severe intensity feeling of being overwhelmed. She states sometimes she has to just go to a room by herself... associated trouble sleeping stating that she wakes up many times during the night wondering about job duties. She says she is also getting an abbreviated amount of sleep since she usually has been about 10 or 11 PM and wakes up almost every morning at 3 AM. Was started on venlafaxine. She requested & received an increased dose in March. She had an evaluation for unexplained CP and dizziness in June (workup was unrevealing). Re-presented in July for ongoing mood symptoms. From BRIDGETTE Cruz's note (highlights mine): 27 y/o female c/o mood swings X couple weeks. Pt does not have a PCP. She reports intermittent mood swings from feeling happy to feeling very sad and crying. She reports she does not know why they are happening & reports no trauma or recent changes in her life. No new stressors. She denies any SI/HI. She reports she lives with her parents and they are supportive. She reports bipolar disorder runs in her family  "and she is concerned she may have this. She is taking Effexor daily. She also had chest pain with associated spinning dizziness about one month ago & was seen by a different provider at that time. EKG & CXR were normal and TSH was slightly elevated - FT4 normal. She reports early CAD family history & has not seen a cardiologist. She reports chest pain (which occurred randomly) seems to have resolved & she denies any current CP. She has taken Antivert for her dizziness with relief. She reports no palpitations, fever, chills, cough, edema, exertional sxs, abd pain, chance of pregnancy, N/V, diaphoresis, sxs of infection, or other medical complaints. She reports she does not smoke. Confirms this hx today, reports "my mood swings are crazy". "I go from super happy to super depressed  I don't know why." Experiences "dark thoughts" including suicidal thoughts & fantasies, but no plans or intent. Denies new stressors identified to trigger moods. Denies episodes prior to recent months. Moods initially swung between euthymic, sad without provocation for weeks but then between euthymic, anxious & sad, & most recently have began to be mostly irritable with tinge of anxiety, featuring frequent racing thoughts whitout pressured speech. Drinks daily - "it calms me". More irritable without it, relaxed with it. No withdrawal symptoms on discontinuation. No tolerance. Bottle of wine daily x 1 year. Rare beer or liquor. Wasn't originally to bring self relief. Had one hangover. No morning alcohol. None during workday. No illicits. No prescription misuse. PsychHx: as above; denies otherwise. MedHx: Heart starts racing, short of breath, crying spells. Triggered by stress. Doesn't happen at home. Never happens for no reason at all. Meds: effexor since February or March - sebas better with anxiety. "not a complete breakdown". antivert - workup with PCP. FamHx: GM - ; no substance abuse problems. SocHx: reviewed. bipolar/scz, aunt bipolar " "disorder. Works as medical assistant for orthopedic surgeons, "takes a lot of the messages".    Review Of Systems:     GENERAL:  +weight gain  SKIN:  No rashes or lacerations  HEAD:  No headaches  CHEST:  No shortness of breath, hyperventilation or cough  CARDIOVASCULAR:  No tachycardia or chest pain  ABDOMEN:  No nausea, vomiting, pain, constipation or diarrhea  URINARY:  No frequency, dysuria or sexual dysfunction  MUSCULOSKELETAL:  No pain or stiffness of the joints  NEUROLOGIC:  No weakness, sensory changes, seizures, confusion, memory loss, tremor or other abnormal movements    Current Evaluation:     Nutritional Screening: Considering the patient's height and weight, medications, medical history and preferences, should a referral be made to the dietitian? no    Constitutional  Vitals:  Most recent vital signs, dated less than 90 days prior to this appointment, were not reviewed.    There were no vitals filed for this visit.     General:  unremarkable, age appropriate     Musculoskeletal  Muscle Strength/Tone:  no dystonia, no tremor   Gait & Station:  non-ataxic     Psychiatric  Appearance: casually dressed & groomed;   Behavior: calm,   Cooperation: cooperative with assessment  Speech: normal rate, volume, tone  Thought Process: linear, goal-directed  Thought Content: No suicidal or homicidal ideation; no delusions  Affect: reactive  Mood: euthymic  Perceptions: No auditory or visual hallucinations  Level of Consciousness: alert throughout interview  Insight: fair  Cognition: Oriented to person, place, time, & situation  Memory: no apparent deficits to general clinical interview; not formally assessed  Attention/Concentration: no apparent deficits to general clinical interview; not formally assessed  Fund of Knowledge: average by vocabulary/education    Laboratory Data  No visits with results within 1 Month(s) from this visit.   Latest known visit with results is:   Lab Visit on 08/22/2017   Component Date " Value Ref Range Status    Cholesterol 08/22/2017 137  120 - 199 mg/dL Final    Triglycerides 08/22/2017 51  30 - 150 mg/dL Final    HDL 08/22/2017 48  40 - 75 mg/dL Final    LDL Cholesterol 08/22/2017 78.8  63.0 - 159.0 mg/dL Final    HDL/Chol Ratio 08/22/2017 35.0  20.0 - 50.0 % Final    Total Cholesterol/HDL Ratio 08/22/2017 2.9  2.0 - 5.0 Final    Non-HDL Cholesterol 08/22/2017 89  mg/dL Final     Medications  Outpatient Encounter Prescriptions as of 12/5/2017   Medication Sig Dispense Refill    quetiapine (SEROQUEL) 100 MG Tab Take 1 tablet (100 mg total) by mouth every evening. 30 tablet 1    venlafaxine (EFFEXOR-XR) 75 MG 24 hr capsule Take 2 capsules (150 mg total) by mouth once daily. 60 capsule 1     Facility-Administered Encounter Medications as of 12/5/2017   Medication Dose Route Frequency Provider Last Rate Last Dose    hyaluronate 16 mg/2 mL injection 16 mg  16 mg Intra-articular 1 time in Clinic/HOD Santino Dang MD         Assessment - Diagnosis - Goals:     Impression: Greater than 6 months of intermittent mood episodes starting with anxiety but later featuring prolonged considerably lability, low moods, suicidal thoughts, irritability. Has benefited from quetiapine but experiencing ongoing weight gain unresponsive to behavioral changes. Has had several unprovoked panic attacks.     Dx: bipolar 2 disorder    Treatment Goals:  Specify outcomes written in observable, behavioral terms:   Decrease mood lability, foster euthymia by self-report, questionnaires    Treatment Plan/Recommendations:   · Discontinue quetiapine & start lurasidone. Continue venlafaxine. Ativan 1 mg daily prn panic attack. Discussed risks, benefits, & alternatives to treatment plan documented above with patient. I answered all patient questions related to this plan & patient expressed understanding & agreement.   · F/u, 2 months.     Return to Clinic: 2 months    Counseling time: 5 minutes  Total time: 20  meir Guillen MD

## 2017-12-06 DIAGNOSIS — M25.561 CHRONIC PAIN OF BOTH KNEES: Primary | ICD-10-CM

## 2017-12-06 DIAGNOSIS — M25.562 CHRONIC PAIN OF BOTH KNEES: Primary | ICD-10-CM

## 2017-12-06 DIAGNOSIS — G89.29 CHRONIC PAIN OF BOTH KNEES: Primary | ICD-10-CM

## 2017-12-07 RX ORDER — DICLOFENAC SODIUM 10 MG/G
2 GEL TOPICAL 4 TIMES DAILY
Qty: 1 TUBE | Refills: 2 | Status: SHIPPED | OUTPATIENT
Start: 2017-12-07 | End: 2018-05-18

## 2017-12-08 ENCOUNTER — OFFICE VISIT (OUTPATIENT)
Dept: ORTHOPEDICS | Facility: CLINIC | Age: 27
End: 2017-12-08
Payer: COMMERCIAL

## 2017-12-08 VITALS
WEIGHT: 194 LBS | HEART RATE: 99 BPM | HEIGHT: 64 IN | BODY MASS INDEX: 33.12 KG/M2 | DIASTOLIC BLOOD PRESSURE: 83 MMHG | SYSTOLIC BLOOD PRESSURE: 128 MMHG

## 2017-12-08 DIAGNOSIS — M25.561 ACUTE PAIN OF RIGHT KNEE: Primary | ICD-10-CM

## 2017-12-08 PROCEDURE — 20610 DRAIN/INJ JOINT/BURSA W/O US: CPT | Mod: RT,S$GLB,, | Performed by: ORTHOPAEDIC SURGERY

## 2017-12-08 PROCEDURE — 99499 UNLISTED E&M SERVICE: CPT | Mod: S$GLB,,, | Performed by: ORTHOPAEDIC SURGERY

## 2017-12-08 PROCEDURE — 99999 PR PBB SHADOW E&M-EST. PATIENT-LVL II: CPT | Mod: PBBFAC,,, | Performed by: ORTHOPAEDIC SURGERY

## 2017-12-08 NOTE — PROGRESS NOTES
Subjective:     Patient ID: Alyssa Urena is a 27 y.o. female.    Chief Complaint: No chief complaint on file.    Alyssa Urena is a 26 y.o. female anterior and right knee pain.      Knee Pain    The pain is present in the right knee. This is a recurrent problem. The current episode started more than 1 year ago. There has been no history of extremity trauma. The problem occurs intermittently and daily. The problem has been gradually worsening. The quality of the pain is described as aching. The pain is at a severity of 5/10. Associated symptoms include joint swelling. Pertinent negatives include no fever or numbness. The symptoms are aggravated by activity and walking. She has tried OTC pain meds and brace/corset for the symptoms. The treatment provided mild relief. Physical therapy was not tried.      Past Medical History:   Diagnosis Date    Chest pain syndrome 8/18/2017    FH: CAD (coronary artery disease) 8/18/2017     Past Surgical History:   Procedure Laterality Date    OOPHORECTOMY  2014     Family History   Problem Relation Age of Onset    Cataracts Maternal Grandmother     Diabetes Maternal Grandmother     Cataracts Maternal Grandfather     Hypertension Mother     Coronary artery disease Father      MI age 40s    Coronary artery disease Paternal Uncle      age 30s     Social History     Social History    Marital status: Single     Spouse name: N/A    Number of children: N/A    Years of education: N/A     Occupational History    Not on file.     Social History Main Topics    Smoking status: Never Smoker    Smokeless tobacco: Never Used    Alcohol use No    Drug use: No    Sexual activity: Yes     Partners: Male     Birth control/ protection: None     Other Topics Concern    Not on file     Social History Narrative    No narrative on file     Medication List with Changes/Refills   Current Medications    DICLOFENAC SODIUM (VOLTAREN) 1 % GEL    Apply 2 g topically 4  (four) times daily.    LORAZEPAM (ATIVAN) 1 MG TABLET    Take 1 tablet (1 mg total) by mouth daily as needed (panic attack).    LURASIDONE (LATUDA) 40 MG TAB TABLET    Take 1 tablet (40 mg total) by mouth once daily.    VENLAFAXINE (EFFEXOR-XR) 75 MG 24 HR CAPSULE    Take 2 capsules (150 mg total) by mouth once daily.     Review of patient's allergies indicates:   Allergen Reactions    Peanut Hives     Review of Systems   Constitution: Negative for fever and night sweats.   HENT: Negative for hearing loss.    Eyes: Negative for blurred vision and visual disturbance.   Cardiovascular: Negative for chest pain and leg swelling.   Respiratory: Negative for shortness of breath.    Endocrine: Negative for polyuria.   Hematologic/Lymphatic: Negative for bleeding problem.   Skin: Negative for rash.   Musculoskeletal: Positive for joint pain and joint swelling. Negative for back pain, muscle cramps and muscle weakness.   Gastrointestinal: Negative for melena.   Genitourinary: Negative for hematuria.   Neurological: Negative for loss of balance, numbness and paresthesias.   Psychiatric/Behavioral: Negative for altered mental status.       Objective:   Body mass index is 33.3 kg/m².  Vitals:    12/08/17 1117   BP: 128/83   Pulse: 99       General: Alyssa is well-developed, well-nourished, appears stated age, in no acute distress, alert and oriented to time, place and person.       General    Vitals reviewed.  Constitutional: She is oriented to person, place, and time. She appears well-developed and well-nourished. No distress.   HENT:   Mouth/Throat: No oropharyngeal exudate.   Eyes: Right eye exhibits no discharge. Left eye exhibits no discharge.   Neck: Normal range of motion.   Pulmonary/Chest: Effort normal and breath sounds normal. No respiratory distress.   Neurological: She is alert and oriented to person, place, and time. She has normal reflexes. No cranial nerve deficit. Coordination normal.   Psychiatric: She has  a normal mood and affect. Her behavior is normal. Judgment and thought content normal.     General Musculoskeletal Exam   Gait: normal       Right Knee Exam     Inspection   Erythema: absent  Scars: absent  Swelling: absent  Effusion: effusion  Deformity: deformity  Bruising: absent    Tenderness   The patient is tender to palpation of the medial joint line and patella.    Crepitus   The patient has crepitus of the patella.    Range of Motion   Extension: 0   Flexion: 140     Tests   Meniscus   Tony:  Medial - positive Lateral - negative  Ligament Examination Lachman: normal (-1 to 2mm) PCL-Posterior Drawer: normal (0 to 2mm)     MCL - Valgus: normal (0 to 2mm)  LCL - Varus: normalPivot Shift: normal (Equal)Reverse Pivot Shift: normal (Equal)Dial Test at 30 degrees: normal (< 5 degrees)Dial Test at 90 degrees: normal (< 5 degrees)  Posterior Sag Test: negative  Posterolateral Corner: unstable (>15 degrees difference)  Patella   Patellar Apprehension: negative  Passive Patellar Tilt: neutral  Patellar Tracking: normal  Patellar Glide (quadrants): Lateral - 1   Medial - 2  Q-Angle at 90 degrees: normal  Patellar Grind: positive  J-Sign: none    Other   Meniscal Cyst: absent  Popliteal (Baker's) Cyst: absent  Sensation: normal    Left Knee Exam     Inspection   Erythema: absent  Scars: absent  Swelling: absent  Effusion: absent  Deformity: deformity  Bruising: absent    Tenderness   The patient tender to palpation of the patella.    Crepitus   The patient has crepitus of the patella.    Range of Motion   Extension: 0   Flexion: 140     Tests   Meniscus   Tony:  Medial - negative Lateral - negative  Stability Lachman: normal (-1 to 2mm) PCL-Posterior Drawer: normal (0 to 2mm)  MCL - Valgus: normal (0 to 2mm)  LCL - Varus: normal (0 to 2mm)Pivot Shift: normal (Equal)Reverse Pivot Shift: normal (Equal)Dial Test at 30 degrees: normal (< 5 degrees)Dial Test at 90 degrees: normal (< 5 degrees)  Posterior Sag Test:  negative  Posterolateral Corner: unstable (>15 degrees difference)  Patella   Patellar Apprehension: negative  Passive Patellar Tilt: neutral  Patellar Tracking: normal  Patellar Glide (Quadrants): Lateral - 1 Medial - 2  Q-Angle at 90 degrees: normal  Patellar Grind: positive  J-Sign: J sign absent    Other   Meniscal Cyst: absent  Popliteal (Baker's) Cyst: absent  Sensation: normal    Right Hip Exam     Tests   Sonja: negative  Left Hip Exam     Tests   Sonja: negative          Muscle Strength   Right Lower Extremity   Hip Abduction: 5/5   Quadriceps:  4/5   Hamstrin/5   Left Lower Extremity   Hip Abduction: 5/5   Quadriceps:  4/5   Hamstrin/5     Reflexes     Left Side  Quadriceps:  2+  Achilles:  2+    Right Side   Quadriceps:  2+  Achilles:  2+    Vascular Exam     Right Pulses  Dorsalis Pedis:      2+  Posterior Tibial:      2+        Left Pulses  Dorsalis Pedis:      2+  Posterior Tibial:      2+            Assessment:     Encounter Diagnosis   Name Primary?    Acute pain of right knee Yes        Plan:     Patient is here for follow up of her knee arthritis. She  is requesting synvisc injection #1.  [facility] reviewed per encounter record.   Sheis requesting synvisc injection #1. She has failed other conservative modalities including NSAIDS, activity modification, weight loss      After viscosupplementation info and post-injection info was given, the right was prepped with betadine and alcohol and injected with lidocaine from an anterolateral approach and then 16 mg synvisc. Patient tolerated the injection well.

## 2017-12-18 ENCOUNTER — OFFICE VISIT (OUTPATIENT)
Dept: ORTHOPEDICS | Facility: CLINIC | Age: 27
End: 2017-12-18
Payer: COMMERCIAL

## 2017-12-18 VITALS
RESPIRATION RATE: 18 BRPM | HEIGHT: 64 IN | HEART RATE: 92 BPM | BODY MASS INDEX: 32.52 KG/M2 | DIASTOLIC BLOOD PRESSURE: 114 MMHG | WEIGHT: 190.5 LBS | SYSTOLIC BLOOD PRESSURE: 159 MMHG

## 2017-12-18 DIAGNOSIS — M22.41 CHONDROMALACIA PATELLAE OF RIGHT KNEE: ICD-10-CM

## 2017-12-18 DIAGNOSIS — M25.561 ACUTE PAIN OF RIGHT KNEE: Primary | ICD-10-CM

## 2017-12-18 PROCEDURE — 99499 UNLISTED E&M SERVICE: CPT | Mod: S$GLB,,, | Performed by: ORTHOPAEDIC SURGERY

## 2017-12-18 PROCEDURE — 20610 DRAIN/INJ JOINT/BURSA W/O US: CPT | Mod: RT,S$GLB,, | Performed by: ORTHOPAEDIC SURGERY

## 2017-12-18 PROCEDURE — 99999 PR PBB SHADOW E&M-EST. PATIENT-LVL III: CPT | Mod: PBBFAC,,, | Performed by: ORTHOPAEDIC SURGERY

## 2017-12-18 RX ORDER — MELOXICAM 15 MG/1
15 TABLET ORAL DAILY
Qty: 30 TABLET | Refills: 2 | Status: SHIPPED | OUTPATIENT
Start: 2017-12-18 | End: 2018-05-18

## 2017-12-18 NOTE — PROGRESS NOTES
Subjective:     Patient ID: Alyssa Urena is a 27 y.o. female.    Chief Complaint: Pain of the Right Knee    Alyssa Urena is a 26 y.o. female anterior and right knee pain.      Knee Pain    The pain is present in the right knee. This is a recurrent problem. The current episode started more than 1 year ago. There has been no history of extremity trauma. The problem occurs intermittently and daily. The problem has been gradually worsening. The quality of the pain is described as aching (pulling). The pain is at a severity of 8/10. Associated symptoms include joint swelling. Pertinent negatives include no fever or numbness. The symptoms are aggravated by activity and walking. She has tried OTC pain meds and brace/corset for the symptoms. The treatment provided mild relief. Physical therapy was not tried.      Past Medical History:   Diagnosis Date    Chest pain syndrome 8/18/2017    FH: CAD (coronary artery disease) 8/18/2017     Past Surgical History:   Procedure Laterality Date    OOPHORECTOMY  2014     Family History   Problem Relation Age of Onset    Cataracts Maternal Grandmother     Diabetes Maternal Grandmother     Cataracts Maternal Grandfather     Hypertension Mother     Coronary artery disease Father      MI age 40s    Coronary artery disease Paternal Uncle      age 30s     Social History     Social History    Marital status: Single     Spouse name: N/A    Number of children: N/A    Years of education: N/A     Occupational History    Not on file.     Social History Main Topics    Smoking status: Never Smoker    Smokeless tobacco: Never Used    Alcohol use No    Drug use: No    Sexual activity: Yes     Partners: Male     Birth control/ protection: None     Other Topics Concern    Not on file     Social History Narrative    No narrative on file     Medication List with Changes/Refills   Current Medications    DICLOFENAC SODIUM (VOLTAREN) 1 % GEL    Apply 2 g  topically 4 (four) times daily.    LORAZEPAM (ATIVAN) 1 MG TABLET    Take 1 tablet (1 mg total) by mouth daily as needed (panic attack).    LURASIDONE (LATUDA) 40 MG TAB TABLET    Take 1 tablet (40 mg total) by mouth once daily.    VENLAFAXINE (EFFEXOR-XR) 75 MG 24 HR CAPSULE    Take 2 capsules (150 mg total) by mouth once daily.     Review of patient's allergies indicates:   Allergen Reactions    Peanut Hives     Review of Systems   Constitution: Negative for fever and night sweats.   HENT: Negative for hearing loss.    Eyes: Negative for blurred vision and visual disturbance.   Cardiovascular: Negative for chest pain and leg swelling.   Respiratory: Negative for shortness of breath.    Endocrine: Negative for polyuria.   Hematologic/Lymphatic: Negative for bleeding problem.   Skin: Negative for rash.   Musculoskeletal: Positive for joint pain and joint swelling. Negative for back pain, muscle cramps and muscle weakness.   Gastrointestinal: Negative for melena.   Genitourinary: Negative for hematuria.   Neurological: Negative for loss of balance, numbness and paresthesias.   Psychiatric/Behavioral: Negative for altered mental status.       Objective:   Body mass index is 32.7 kg/m².  Vitals:    12/18/17 0934   BP: (!) 159/114   Pulse: 92   Resp: 18       General: Alyssa is well-developed, well-nourished, appears stated age, in no acute distress, alert and oriented to time, place and person.       General    Vitals reviewed.  Constitutional: She is oriented to person, place, and time. She appears well-developed and well-nourished. No distress.   HENT:   Mouth/Throat: No oropharyngeal exudate.   Eyes: Right eye exhibits no discharge. Left eye exhibits no discharge.   Neck: Normal range of motion.   Pulmonary/Chest: Effort normal and breath sounds normal. No respiratory distress.   Neurological: She is alert and oriented to person, place, and time. She has normal reflexes. No cranial nerve deficit. Coordination  normal.   Psychiatric: She has a normal mood and affect. Her behavior is normal. Judgment and thought content normal.     General Musculoskeletal Exam   Gait: normal       Right Knee Exam     Inspection   Erythema: absent  Scars: absent  Swelling: absent  Effusion: effusion  Deformity: deformity  Bruising: absent    Tenderness   The patient is tender to palpation of the medial joint line and patella.    Crepitus   The patient has crepitus of the patella.    Range of Motion   Extension: 0   Flexion: 140     Tests   Meniscus   Tony:  Medial - positive Lateral - negative  Ligament Examination Lachman: normal (-1 to 2mm) PCL-Posterior Drawer: normal (0 to 2mm)     MCL - Valgus: normal (0 to 2mm)  LCL - Varus: normalPivot Shift: normal (Equal)Reverse Pivot Shift: normal (Equal)Dial Test at 30 degrees: normal (< 5 degrees)Dial Test at 90 degrees: normal (< 5 degrees)  Posterior Sag Test: negative  Posterolateral Corner: unstable (>15 degrees difference)  Patella   Patellar Apprehension: negative  Passive Patellar Tilt: neutral  Patellar Tracking: normal  Patellar Glide (quadrants): Lateral - 1   Medial - 2  Q-Angle at 90 degrees: normal  Patellar Grind: positive  J-Sign: none    Other   Meniscal Cyst: absent  Popliteal (Baker's) Cyst: absent  Sensation: normal    Left Knee Exam     Inspection   Erythema: absent  Scars: absent  Swelling: absent  Effusion: absent  Deformity: deformity  Bruising: absent    Tenderness   The patient tender to palpation of the patella.    Crepitus   The patient has crepitus of the patella.    Range of Motion   Extension: 0   Flexion: 140     Tests   Meniscus   Tony:  Medial - negative Lateral - negative  Stability Lachman: normal (-1 to 2mm) PCL-Posterior Drawer: normal (0 to 2mm)  MCL - Valgus: normal (0 to 2mm)  LCL - Varus: normal (0 to 2mm)Pivot Shift: normal (Equal)Reverse Pivot Shift: normal (Equal)Dial Test at 30 degrees: normal (< 5 degrees)Dial Test at 90 degrees: normal (< 5  degrees)  Posterior Sag Test: negative  Posterolateral Corner: unstable (>15 degrees difference)  Patella   Patellar Apprehension: negative  Passive Patellar Tilt: neutral  Patellar Tracking: normal  Patellar Glide (Quadrants): Lateral - 1 Medial - 2  Q-Angle at 90 degrees: normal  Patellar Grind: positive  J-Sign: J sign absent    Other   Meniscal Cyst: absent  Popliteal (Baker's) Cyst: absent  Sensation: normal    Right Hip Exam     Tests   Sonja: negative  Left Hip Exam     Tests   Sonja: negative          Muscle Strength   Right Lower Extremity   Hip Abduction: 5/5   Quadriceps:  4/5   Hamstrin/5   Left Lower Extremity   Hip Abduction: 5/5   Quadriceps:  4/5   Hamstrin/5     Reflexes     Left Side  Quadriceps:  2+  Achilles:  2+    Right Side   Quadriceps:  2+  Achilles:  2+    Vascular Exam     Right Pulses  Dorsalis Pedis:      2+  Posterior Tibial:      2+        Left Pulses  Dorsalis Pedis:      2+  Posterior Tibial:      2+            Assessment:     Encounter Diagnoses   Name Primary?    Acute pain of right knee Yes    Chondromalacia patellae of right knee         Plan:     Patient is here for follow up of her knee arthritis. She  is requesting synvisc injection #2.  [facility] reviewed per encounter record.   Sheis requesting synvisc injection #2. She has failed other conservative modalities including NSAIDS, activity modification, weight loss    The first shot was tolerated well.    After viscosupplementation info and post-injection info was given, the right knee was prepped with betadine and alcohol and injected with lidocaine from an anterolateral approach and then 16 mg synvisc. Patient tolerated the injection well.

## 2017-12-26 ENCOUNTER — OFFICE VISIT (OUTPATIENT)
Dept: ORTHOPEDICS | Facility: CLINIC | Age: 27
End: 2017-12-26
Payer: COMMERCIAL

## 2017-12-26 VITALS
HEIGHT: 64 IN | WEIGHT: 190.5 LBS | HEART RATE: 90 BPM | RESPIRATION RATE: 12 BRPM | DIASTOLIC BLOOD PRESSURE: 104 MMHG | SYSTOLIC BLOOD PRESSURE: 156 MMHG | BODY MASS INDEX: 32.52 KG/M2

## 2017-12-26 DIAGNOSIS — M22.41 CHONDROMALACIA PATELLAE OF RIGHT KNEE: Primary | ICD-10-CM

## 2017-12-26 PROCEDURE — 99499 UNLISTED E&M SERVICE: CPT | Mod: S$GLB,,, | Performed by: PHYSICIAN ASSISTANT

## 2017-12-26 PROCEDURE — 20610 DRAIN/INJ JOINT/BURSA W/O US: CPT | Mod: RT,S$GLB,, | Performed by: PHYSICIAN ASSISTANT

## 2017-12-26 PROCEDURE — 99999 PR PBB SHADOW E&M-EST. PATIENT-LVL III: CPT | Mod: PBBFAC,,, | Performed by: PHYSICIAN ASSISTANT

## 2017-12-26 NOTE — PROGRESS NOTES
Alyssa comes in today for her third Synvisc injection in the right knee.  Pain is doing much better.  She denies any complications from her prior 2 injections.  She denies symptoms of pseudo-septic reaction.  She tells me she is not ALLERGIC to eggs.  We will proceed with the third injection today.  She should follow up in the office in 3 months with Dr. Dang.  Should she have problems before then, she is welcome to contact me directly.  Patient verbalizes understanding and agrees.    Right Knee Synvisc #3 Injection Report:  After verbal consent was obtained for right knee injection, patient ID, site, and side were verified.  The  right  knee was sterilly prepped in the standard fashion.  A 22-gauge needle was introduced into right knee joint from an supero-lateral site without complication. The right knee was then injected with 1 syringe of Synvisc.  A sterile bandaid was applied.  The patient was informed to apply an ice pack approximately 10min once arriving home and not to do anything strenuous for 24hours.  She was instructed to call if there were any problems. The patient was discharged in stable condition.

## 2018-01-04 ENCOUNTER — PATIENT MESSAGE (OUTPATIENT)
Dept: PSYCHIATRY | Facility: CLINIC | Age: 28
End: 2018-01-04

## 2018-01-04 NOTE — TELEPHONE ENCOUNTER
Please see pt request.        Please see pt msg.  I offered an appt tomorrow but she isn't available.

## 2018-01-09 ENCOUNTER — PATIENT MESSAGE (OUTPATIENT)
Dept: PSYCHIATRY | Facility: CLINIC | Age: 28
End: 2018-01-09

## 2018-01-09 RX ORDER — QUETIAPINE FUMARATE 100 MG/1
TABLET, FILM COATED ORAL
Qty: 45 TABLET | Refills: 1 | Status: SHIPPED | OUTPATIENT
Start: 2018-01-09 | End: 2018-04-23 | Stop reason: ALTCHOICE

## 2018-01-11 DIAGNOSIS — M25.561 ACUTE PAIN OF RIGHT KNEE: ICD-10-CM

## 2018-01-11 DIAGNOSIS — M22.41 CHONDROMALACIA PATELLAE OF RIGHT KNEE: Primary | ICD-10-CM

## 2018-01-11 DIAGNOSIS — S83.001D ACQUIRED SUBLUXATION OF RIGHT PATELLA, SUBSEQUENT ENCOUNTER: ICD-10-CM

## 2018-01-12 ENCOUNTER — OFFICE VISIT (OUTPATIENT)
Dept: OPHTHALMOLOGY | Facility: CLINIC | Age: 28
End: 2018-01-12
Payer: COMMERCIAL

## 2018-01-12 DIAGNOSIS — H52.203 MYOPIA OF BOTH EYES WITH ASTIGMATISM: Primary | ICD-10-CM

## 2018-01-12 DIAGNOSIS — M94.261 CHONDROMALACIA OF KNEE, RIGHT: Primary | ICD-10-CM

## 2018-01-12 DIAGNOSIS — H52.13 MYOPIA OF BOTH EYES WITH ASTIGMATISM: Primary | ICD-10-CM

## 2018-01-12 PROCEDURE — 99999 PR PBB SHADOW E&M-EST. PATIENT-LVL I: CPT | Mod: PBBFAC,,, | Performed by: OPTOMETRIST

## 2018-01-12 PROCEDURE — 92015 DETERMINE REFRACTIVE STATE: CPT | Mod: S$GLB,,, | Performed by: OPTOMETRIST

## 2018-01-12 PROCEDURE — 92014 COMPRE OPH EXAM EST PT 1/>: CPT | Mod: S$GLB,,, | Performed by: OPTOMETRIST

## 2018-01-12 PROCEDURE — 92310 CONTACT LENS FITTING OU: CPT | Mod: ,,, | Performed by: OPTOMETRIST

## 2018-01-12 RX ORDER — CELECOXIB 200 MG/1
200 CAPSULE ORAL 2 TIMES DAILY
Qty: 60 CAPSULE | Refills: 1 | Status: SHIPPED | OUTPATIENT
Start: 2018-01-12 | End: 2018-05-18

## 2018-01-12 RX ORDER — CELECOXIB 200 MG/1
200 CAPSULE ORAL 2 TIMES DAILY
Qty: 60 CAPSULE | Refills: 1 | Status: CANCELLED | OUTPATIENT
Start: 2018-01-12

## 2018-01-13 NOTE — PROGRESS NOTES
HPI     Pts last exam was over 5 years ago. PT c/o blurred distance and wears gls   full time.   HPI    Any vision changes since last exam: decreased distance va  Eye pain: no  Other ocular symptoms: no    Do you wear currently wear glasses or contacts? gls    Interested in contacts today? Yes, unsure what she has worn previously.   Has not worn in about a year    Do you plan on getting new glasses today? If needed      Last edited by Leigh Ann Bryan MA on 1/12/2018  5:16 PM. (History)            Assessment /Plan     For exam results, see Encounter Report.    Myopia of both eyes with astigmatism      Eyeglass Final Rx     Eyeglass Final Rx       Sphere Cylinder Axis Dist VA    Right -5.75 +0.50 030 20/20    Left -7.50 +0.75 105 20/20    Expiration Date:  1/13/2019              Contact Lens Prescription (1/12/2018)        Brand Base Curve Diameter Sphere    Right Biofinity Energys 8.60 14.0 -5.50    Left Biofinity Energys 8.60 14.0 -7.00    Expiration Date:  1/13/2019    Replacement:  Monthly    Solutions:  OptiFree PureMoist    Wearing Schedule:  Daily wear        RTC 1 yr for dilated eye exam or PRN if any problems.   Discussed above and answered questions.

## 2018-02-14 ENCOUNTER — PATIENT MESSAGE (OUTPATIENT)
Dept: OPHTHALMOLOGY | Facility: CLINIC | Age: 28
End: 2018-02-14

## 2018-02-15 ENCOUNTER — PATIENT MESSAGE (OUTPATIENT)
Dept: INTERNAL MEDICINE | Facility: CLINIC | Age: 28
End: 2018-02-15

## 2018-03-22 ENCOUNTER — PATIENT MESSAGE (OUTPATIENT)
Dept: PSYCHIATRY | Facility: CLINIC | Age: 28
End: 2018-03-22

## 2018-04-16 ENCOUNTER — PATIENT MESSAGE (OUTPATIENT)
Dept: OBSTETRICS AND GYNECOLOGY | Facility: CLINIC | Age: 28
End: 2018-04-16

## 2018-04-23 ENCOUNTER — OFFICE VISIT (OUTPATIENT)
Dept: PSYCHIATRY | Facility: CLINIC | Age: 28
End: 2018-04-23
Payer: COMMERCIAL

## 2018-04-23 ENCOUNTER — LAB VISIT (OUTPATIENT)
Dept: LAB | Facility: HOSPITAL | Age: 28
End: 2018-04-23
Attending: PSYCHIATRY & NEUROLOGY
Payer: COMMERCIAL

## 2018-04-23 DIAGNOSIS — F31.81 BIPOLAR 2 DISORDER: Primary | Chronic | ICD-10-CM

## 2018-04-23 DIAGNOSIS — G47.00 INSOMNIA, UNSPECIFIED TYPE: ICD-10-CM

## 2018-04-23 DIAGNOSIS — F32.A DEPRESSION, UNSPECIFIED DEPRESSION TYPE: ICD-10-CM

## 2018-04-23 PROCEDURE — 84443 ASSAY THYROID STIM HORMONE: CPT

## 2018-04-23 PROCEDURE — 99213 OFFICE O/P EST LOW 20 MIN: CPT | Mod: S$GLB,,, | Performed by: PSYCHIATRY & NEUROLOGY

## 2018-04-23 PROCEDURE — 36415 COLL VENOUS BLD VENIPUNCTURE: CPT | Mod: PO

## 2018-04-23 RX ORDER — TRAZODONE HYDROCHLORIDE 100 MG/1
TABLET ORAL
Qty: 30 TABLET | Refills: 2 | Status: SHIPPED | OUTPATIENT
Start: 2018-04-23 | End: 2018-06-27 | Stop reason: ALTCHOICE

## 2018-04-23 RX ORDER — VENLAFAXINE HYDROCHLORIDE 75 MG/1
150 CAPSULE, EXTENDED RELEASE ORAL DAILY
Qty: 60 CAPSULE | Refills: 2 | Status: SHIPPED | OUTPATIENT
Start: 2018-04-23 | End: 2018-06-27 | Stop reason: ALTCHOICE

## 2018-04-23 RX ORDER — LAMOTRIGINE 100 MG/1
TABLET ORAL
Qty: 30 TABLET | Refills: 1 | Status: SHIPPED | OUTPATIENT
Start: 2018-06-04 | End: 2018-06-27 | Stop reason: SDUPTHER

## 2018-04-23 RX ORDER — LAMOTRIGINE 25 MG/1
TABLET ORAL
Qty: 84 TABLET | Refills: 0 | Status: SHIPPED | OUTPATIENT
Start: 2018-04-23 | End: 2018-06-19

## 2018-04-23 NOTE — PROGRESS NOTES
Outpatient Psychiatry Follow-up Visit (MD/NP)    4/23/2018    Alyssa Urena, a 27 y.o. female, presenting for follow-up visit. Met with patient.    Reason for Encounter: follow-up    IntervalHx: Patient seen & interviewed for follow-up, last seen about 4.5 months ago. Reports moods had improved following visit in December, but worsened again on returning to previously stressful job situation. Irritable/agitated. Also reports off mental health meds. Prior to being out of meds had been adherent with latuda and venlafaxine, felt that they were helping and didn't have any apparent side effects. Says she's had some weight gain, but less than previously and some hair loss. No other new stressors besides job. Health otherwise ok.     Background: 27 y/o F referred by PA for mood lability. First presented to [facility] in February: 3 week complaint of stress and anxiety at work... states that her job they're understaffed and she feels overworked most of the time... anxiety is intermittent moderate to severe intensity feeling of being overwhelmed. She states sometimes she has to just go to a room by herself... associated trouble sleeping stating that she wakes up many times during the night wondering about job duties. She says she is also getting an abbreviated amount of sleep since she usually has been about 10 or 11 PM and wakes up almost every morning at 3 AM. Was started on venlafaxine. She requested & received an increased dose in March. She had an evaluation for unexplained CP and dizziness in June (workup was unrevealing). Re-presented in July for ongoing mood symptoms. From BRIDGETTE Cruz's note (highlights mine): 27 y/o female c/o mood swings X couple weeks. Pt does not have a PCP. She reports intermittent mood swings from feeling happy to feeling very sad and crying. She reports she does not know why they are happening & reports no trauma or recent changes in her life. No new stressors. She denies any SI/HI.  "She reports she lives with her parents and they are supportive. She reports bipolar disorder runs in her family and she is concerned she may have this. She is taking Effexor daily. She also had chest pain with associated spinning dizziness about one month ago & was seen by a different provider at that time. EKG & CXR were normal and TSH was slightly elevated - FT4 normal. She reports early CAD family history & has not seen a cardiologist. She reports chest pain (which occurred randomly) seems to have resolved & she denies any current CP. She has taken Antivert for her dizziness with relief. She reports no palpitations, fever, chills, cough, edema, exertional sxs, abd pain, chance of pregnancy, N/V, diaphoresis, sxs of infection, or other medical complaints. She reports she does not smoke. Confirms this hx today, reports "my mood swings are crazy". "I go from super happy to super depressed  I don't know why." Experiences "dark thoughts" including suicidal thoughts & fantasies, but no plans or intent. Denies new stressors identified to trigger moods. Denies episodes prior to recent months. Moods initially swung between euthymic, sad without provocation for weeks but then between euthymic, anxious & sad, & most recently have began to be mostly irritable with tinge of anxiety, featuring frequent racing thoughts whitout pressured speech. Drinks daily - "it calms me". More irritable without it, relaxed with it. No withdrawal symptoms on discontinuation. No tolerance. Bottle of wine daily x 1 year. Rare beer or liquor. Wasn't originally to bring self relief. Had one hangover. No morning alcohol. None during workday. No illicits. No prescription misuse. PsychHx: as above; denies otherwise. MedHx: Heart starts racing, short of breath, crying spells. Triggered by stress. Doesn't happen at home. Never happens for no reason at all. Meds: effexor since February or March - sebas better with anxiety. "not a complete breakdown". " "antivert - workup with PCP. FamHx: GM - ; no substance abuse problems. SocHx: reviewed. bipolar/scz, aunt bipolar disorder. Works as medical assistant for orthopedic surgeons, "takes a lot of the messages".    Review Of Systems:     GENERAL:  +weight gain  SKIN:  No rashes or lacerations  HEAD:  No headaches  CHEST:  No shortness of breath, hyperventilation or cough  CARDIOVASCULAR:  No tachycardia or chest pain  ABDOMEN:  No nausea, vomiting, pain, constipation or diarrhea  URINARY:  No frequency, dysuria or sexual dysfunction  MUSCULOSKELETAL:  No pain or stiffness of the joints  NEUROLOGIC:  No weakness, sensory changes, seizures, confusion, memory loss, tremor or other abnormal movements    Current Evaluation:     Nutritional Screening: Considering the patient's height and weight, medications, medical history and preferences, should a referral be made to the dietitian? no    Constitutional  Vitals:  Most recent vital signs, dated less than 90 days prior to this appointment, were not reviewed.    There were no vitals filed for this visit.     General:  unremarkable, age appropriate     Musculoskeletal  Muscle Strength/Tone:  no dystonia, no tremor   Gait & Station:  non-ataxic     Psychiatric  Appearance: casually dressed & groomed;   Behavior: calm,   Cooperation: cooperative with assessment  Speech: normal rate, volume, tone  Thought Process: linear, goal-directed  Thought Content: No suicidal or homicidal ideation; no delusions  Affect: reactive  Mood: euthymic  Perceptions: No auditory or visual hallucinations  Level of Consciousness: alert throughout interview  Insight: fair  Cognition: Oriented to person, place, time, & situation  Memory: no apparent deficits to general clinical interview; not formally assessed  Attention/Concentration: no apparent deficits to general clinical interview; not formally assessed  Fund of Knowledge: average by vocabulary/education    Laboratory Data  No visits with results " within 1 Month(s) from this visit.   Latest known visit with results is:   Lab Visit on 08/22/2017   Component Date Value Ref Range Status    Cholesterol 08/22/2017 137  120 - 199 mg/dL Final    Triglycerides 08/22/2017 51  30 - 150 mg/dL Final    HDL 08/22/2017 48  40 - 75 mg/dL Final    LDL Cholesterol 08/22/2017 78.8  63.0 - 159.0 mg/dL Final    HDL/Chol Ratio 08/22/2017 35.0  20.0 - 50.0 % Final    Total Cholesterol/HDL Ratio 08/22/2017 2.9  2.0 - 5.0 Final    Non-HDL Cholesterol 08/22/2017 89  mg/dL Final     Medications  Outpatient Encounter Prescriptions as of 4/23/2018   Medication Sig Dispense Refill    celecoxib (CELEBREX) 200 MG capsule Take 1 capsule (200 mg total) by mouth 2 (two) times daily. 60 capsule 1    diclofenac sodium (VOLTAREN) 1 % Gel Apply 2 g topically 4 (four) times daily. 1 Tube 2    LORazepam (ATIVAN) 1 MG tablet Take 1 tablet (1 mg total) by mouth daily as needed (panic attack). 15 tablet 0    meloxicam (MOBIC) 15 MG tablet Take 1 tablet (15 mg total) by mouth once daily. 30 tablet 2    QUEtiapine (SEROQUEL) 100 MG Tab Take 1/2 tablet at bedtime for 3 days then 1 tablet at bedtime for 3 days then 1 and 1/2 tablet at bedtime for 3 days 45 tablet 1    venlafaxine (EFFEXOR-XR) 75 MG 24 hr capsule Take 2 capsules (150 mg total) by mouth once daily. 60 capsule 2     No facility-administered encounter medications on file as of 4/23/2018.      Assessment - Diagnosis - Goals:     Impression: intermittent mood episodes starting with anxiety but later featuring prolonged considerably lability, low moods, suicidal thoughts, irritability. Benefited from quetiapine but experienced considerable weight gain unresponsive to behavioral changes. Had several unprovoked panic attacks. Weight gain reduced on lurasidone and therapeutic benefits were ok.     Dx: bipolar 2 disorder    Treatment Goals:  Specify outcomes written in observable, behavioral terms:   Decrease mood lability, foster  euthymia by self-report, questionnaires    Treatment Plan/Recommendations:   · Start lamotrigine. Restart venlafaxine. Trazodone prn sleep. Ativan 1 mg daily prn panic attack. Discussed risks, benefits, & alternatives to treatment plan documented above with patient. I answered all patient questions related to this plan & patient expressed understanding & agreement. Stop lamotrigine in case of new rash. She's agreeable to this.   · F/u, 2 months.     Return to Clinic: 2 months    Counseling time: 5 minutes  Total time: 20 minutes    HIMA Guillen MD

## 2018-04-24 LAB — TSH SERPL DL<=0.005 MIU/L-ACNC: 2.77 UIU/ML

## 2018-05-18 ENCOUNTER — OFFICE VISIT (OUTPATIENT)
Dept: ORTHOPEDICS | Facility: CLINIC | Age: 28
End: 2018-05-18
Payer: COMMERCIAL

## 2018-05-18 VITALS
DIASTOLIC BLOOD PRESSURE: 77 MMHG | SYSTOLIC BLOOD PRESSURE: 116 MMHG | HEART RATE: 81 BPM | HEIGHT: 64 IN | BODY MASS INDEX: 36.37 KG/M2 | RESPIRATION RATE: 12 BRPM | WEIGHT: 213 LBS

## 2018-05-18 DIAGNOSIS — M23.91 INTERNAL DERANGEMENT OF MULTIPLE SITES OF RIGHT KNEE: Primary | ICD-10-CM

## 2018-05-18 PROCEDURE — 99214 OFFICE O/P EST MOD 30 MIN: CPT | Mod: S$GLB,,, | Performed by: PHYSICIAN ASSISTANT

## 2018-05-18 PROCEDURE — 3008F BODY MASS INDEX DOCD: CPT | Mod: CPTII,S$GLB,, | Performed by: PHYSICIAN ASSISTANT

## 2018-05-18 PROCEDURE — 99999 PR PBB SHADOW E&M-EST. PATIENT-LVL III: CPT | Mod: PBBFAC,,, | Performed by: PHYSICIAN ASSISTANT

## 2018-05-18 RX ORDER — PENICILLIN V POTASSIUM 500 MG/1
TABLET, FILM COATED ORAL
COMMUNITY
Start: 2018-02-28 | End: 2018-06-19

## 2018-05-18 RX ORDER — HYDROCODONE BITARTRATE AND ACETAMINOPHEN 7.5; 325 MG/1; MG/1
TABLET ORAL
COMMUNITY
Start: 2018-02-28 | End: 2018-06-19

## 2018-05-18 NOTE — PROGRESS NOTES
Patient ID: Alyssa Urena is a 27 y.o. female.    Chief Complaint: Pain, Injury, and Follow-up of the Right Knee      HPI: Alyssa Urena  is a 27 y.o. female who c/o Pain, Injury, and Follow-up of the Right Knee   for duration of 3 weeks.  She fell about 3 weeks ago and felt a pop in the right knee. Since then, she has been having intermittent sharp pain. She also complains of a pulling sensation.  On Saturday, she was walking up some stairs and she felt a pop again. She had severe sharp pain when this happened and was unable to weight bear immediately following the incident.  Pain level today is 6/10 in severity.  Quality is sharp and pulling.  Alleviating factors include rest.  I ring factors include deep flexion, and pivoting.  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 This was much improved until 3 weeks ago when she had this new problem.  She has tried over-the-counter oral anti-inflammatories as well as Tylenol without relief of symptoms. The pain she experiences now is certainly different than it was last fall.  She has been on a prescription of Celebrex in the past.  However she discontinued it after developing shortness of breath.          Objective:        General    Nursing note and vitals reviewed.  Constitutional: She is oriented to person, place, and time. She appears well-developed and well-nourished.   HENT:   Head: Normocephalic and atraumatic.   Eyes: EOM are normal.   Cardiovascular: Normal rate and regular rhythm.    Pulmonary/Chest: Effort normal.   Abdominal: Soft.   Neurological: She is alert and oriented to person, place, and time.   Psychiatric: She has a normal mood and affect. Her behavior is normal.     General Musculoskeletal Exam   Gait: normal       Right Knee Exam     Inspection   Erythema: absent  Swelling: absent  Effusion: effusion  Deformity: deformity  Bruising: absent    Tenderness   The patient is tender to  palpation of the medial joint line.    Range of Motion   Extension: normal   Flexion: abnormal Right knee flexion: pain in deep flexion.     Tests   Meniscus   Tony:  Medial - positive Lateral - negative  Ligament Examination Lachman: normal (-1 to 2mm) PCL-Posterior Drawer: normal (0 to 2mm)     MCL - Valgus: normal (0 to 2mm)  LCL - Varus: normal  Patella   Patellar Apprehension: negative  Patellar Tracking: normal  Patellar Grind: negative    Other   Meniscal Cyst: absent  Popliteal (Baker's) Cyst: absent  Sensation: normal    Comments:  Comp soft, cap refill < 2 sec.    Muscle Strength   Right Lower Extremity   Quadriceps:  5/5   Hamstrin/5     Vascular Exam       Edema  Right Lower Leg: absent                Assessment:       Encounter Diagnosis   Name Primary?    Internal derangement of multiple sites of right knee Yes          Plan:       Alyssa was seen today for pain, injury and follow-up.    Diagnoses and all orders for this visit:    Internal derangement of multiple sites of right knee  -     MRI Knee Without Contrast Right; Future    Alyssa comes in today for new problem of the right knee. She sustained a new injury about 3 weeks ago and has been having sharp pain since then.  She has failed to improve with over-the-counter anti-inflammatories and Tylenol.  She has failed to improve with her knee brace.  She is having meniscal symptoms and exam is suspicious for medial meniscus tear.  I recommend an MRI of the right knee without contrast to evaluate for medial meniscus tear.  I will call her with those results once they become available.  She verbalizes understanding and agrees.        The patient understands, chooses and consents to this plan and accepts all   the risks which include but are not limited to the risks mentioned above.     Disclaimer: This note was prepared using a voice recognition system and is likely to have sound alike errors within the text.

## 2018-05-22 DIAGNOSIS — M23.91 INTERNAL DERANGEMENT OF MULTIPLE SITES OF RIGHT KNEE: Primary | ICD-10-CM

## 2018-05-22 DIAGNOSIS — S83.001D ACQUIRED SUBLUXATION OF RIGHT PATELLA, SUBSEQUENT ENCOUNTER: ICD-10-CM

## 2018-05-22 DIAGNOSIS — M94.261 CHONDROMALACIA OF KNEE, RIGHT: ICD-10-CM

## 2018-05-22 DIAGNOSIS — M25.561 ACUTE PAIN OF RIGHT KNEE: ICD-10-CM

## 2018-05-25 ENCOUNTER — HOSPITAL ENCOUNTER (OUTPATIENT)
Dept: RADIOLOGY | Facility: HOSPITAL | Age: 28
Discharge: HOME | End: 2018-05-25
Attending: PHYSICIAN ASSISTANT
Payer: COMMERCIAL

## 2018-05-25 DIAGNOSIS — M23.91 INTERNAL DERANGEMENT OF MULTIPLE SITES OF RIGHT KNEE: ICD-10-CM

## 2018-05-25 PROCEDURE — 73721 MRI JNT OF LWR EXTRE W/O DYE: CPT | Mod: TC,PO,RT

## 2018-05-25 PROCEDURE — 73721 MRI JNT OF LWR EXTRE W/O DYE: CPT | Mod: 26,RT,, | Performed by: RADIOLOGY

## 2018-06-19 ENCOUNTER — OFFICE VISIT (OUTPATIENT)
Dept: OBSTETRICS AND GYNECOLOGY | Facility: CLINIC | Age: 28
End: 2018-06-19
Payer: COMMERCIAL

## 2018-06-19 VITALS
WEIGHT: 210.75 LBS | SYSTOLIC BLOOD PRESSURE: 122 MMHG | DIASTOLIC BLOOD PRESSURE: 80 MMHG | BODY MASS INDEX: 36.18 KG/M2

## 2018-06-19 DIAGNOSIS — Z30.432 ENCOUNTER FOR IUD REMOVAL: Primary | ICD-10-CM

## 2018-06-19 DIAGNOSIS — R87.611 PAPANICOLAOU SMEAR OF CERVIX WITH ATYPICAL SQUAMOUS CELLS CANNOT EXCLUDE HIGH GRADE SQUAMOUS INTRAEPITHELIAL LESION (ASC-H): ICD-10-CM

## 2018-06-19 DIAGNOSIS — Z11.3 SCREEN FOR STD (SEXUALLY TRANSMITTED DISEASE): ICD-10-CM

## 2018-06-19 DIAGNOSIS — B96.89 BV (BACTERIAL VAGINOSIS): ICD-10-CM

## 2018-06-19 DIAGNOSIS — N76.0 BV (BACTERIAL VAGINOSIS): ICD-10-CM

## 2018-06-19 DIAGNOSIS — Z30.8 ENCOUNTER FOR OTHER CONTRACEPTIVE MANAGEMENT: ICD-10-CM

## 2018-06-19 PROBLEM — R85.611: Status: RESOLVED | Noted: 2017-09-20 | Resolved: 2018-06-19

## 2018-06-19 PROCEDURE — 58301 REMOVE INTRAUTERINE DEVICE: CPT | Mod: S$GLB,,, | Performed by: OBSTETRICS & GYNECOLOGY

## 2018-06-19 PROCEDURE — 99214 OFFICE O/P EST MOD 30 MIN: CPT | Mod: 25,S$GLB,, | Performed by: OBSTETRICS & GYNECOLOGY

## 2018-06-19 PROCEDURE — 3008F BODY MASS INDEX DOCD: CPT | Mod: CPTII,S$GLB,, | Performed by: OBSTETRICS & GYNECOLOGY

## 2018-06-19 PROCEDURE — 99999 PR PBB SHADOW E&M-EST. PATIENT-LVL III: CPT | Mod: PBBFAC,,, | Performed by: OBSTETRICS & GYNECOLOGY

## 2018-06-19 PROCEDURE — 87210 SMEAR WET MOUNT SALINE/INK: CPT | Mod: QW,S$GLB,, | Performed by: OBSTETRICS & GYNECOLOGY

## 2018-06-19 PROCEDURE — 87491 CHLMYD TRACH DNA AMP PROBE: CPT

## 2018-06-19 PROCEDURE — 81025 URINE PREGNANCY TEST: CPT | Mod: S$GLB,,, | Performed by: OBSTETRICS & GYNECOLOGY

## 2018-06-19 RX ORDER — NORGESTIMATE AND ETHINYL ESTRADIOL 7DAYSX3 28
1 KIT ORAL DAILY
Qty: 28 TABLET | Refills: 3 | Status: SHIPPED | OUTPATIENT
Start: 2018-06-19 | End: 2018-11-13

## 2018-06-19 RX ORDER — METRONIDAZOLE 500 MG/1
500 TABLET ORAL 2 TIMES DAILY
Qty: 14 TABLET | Refills: 0 | Status: SHIPPED | OUTPATIENT
Start: 2018-06-19 | End: 2018-06-27

## 2018-06-19 NOTE — PROGRESS NOTES
Subjective:       Patient ID: Alyssa Urena is a 27 y.o. female.    Chief Complaint:  Contraception (IUD removal)      History of Present Illness  HPI  Pt reports complaints of having pelvic pains and irregular bleeding problems.  Pt believes issues are related to her Mirena and would like to have this removed today.  Pt also has a hsitory of ASC-H pap in 09/2017 but was lost to follow up for colposcopy.  Pt states that she got busy with work and never made time to come in.  Pt is not sexually active but is interested in continuing contraception.    GYN & OB History  No LMP recorded. Patient is not currently having periods (Reason: Birth Control).   Date of Last Pap: 9/13/2017    OB History   No data available       Review of Systems  Review of Systems   Constitutional: Negative for activity change, appetite change, fatigue, fever and unexpected weight change.   Respiratory: Negative for shortness of breath.    Cardiovascular: Negative.  Negative for chest pain, palpitations and leg swelling.   Gastrointestinal: Positive for abdominal pain. Negative for bloating, blood in stool, constipation, diarrhea, nausea and vomiting.   Genitourinary: Positive for menorrhagia, menstrual problem, pelvic pain, vaginal discharge and dysmenorrhea. Negative for dyspareunia, dysuria, flank pain, frequency, genital sores, hematuria, vaginal bleeding, vaginal pain, urinary incontinence and vaginal odor.   Musculoskeletal: Negative for back pain.   Neurological: Negative for syncope and headaches.           Objective:    Physical Exam:   Constitutional: She is oriented to person, place, and time. She appears well-developed and well-nourished. No distress.       Cardiovascular: Normal rate and regular rhythm.     Pulmonary/Chest: Effort normal.        Abdominal: Soft. Bowel sounds are normal. She exhibits no distension. There is no tenderness.     Genitourinary: Uterus normal. Pelvic exam was performed with patient supine.  There is no rash, tenderness, lesion or injury on the right labia. There is no rash, tenderness, lesion or injury on the left labia. Uterus is not deviated, not enlarged and not tender. Cervix is normal. Right adnexum displays no mass, no tenderness and no fullness. Left adnexum displays no mass, no tenderness and no fullness. There is erythema and tenderness in the vagina. No bleeding in the vagina. No foreign body in the vagina. No signs of injury around the vagina. Vaginal discharge found. Cervix exhibits friability. Cervix exhibits no motion tenderness and no discharge. Additional cervical findings: IUD strings visualized  Genitourinary Comments: UPT today Negative  Wet Prep: many clue cells, many WBC, negative for trichomonas or yeast  See procedure note for IUD removal           Musculoskeletal: Normal range of motion and moves all extremeties. She exhibits no edema or tenderness.       Neurological: She is alert and oriented to person, place, and time.    Skin: Skin is warm and dry.    Psychiatric: She has a normal mood and affect. Her behavior is normal. Thought content normal.          Assessment:        1. Encounter for IUD removal    2. Encounter for other contraceptive management    3. BV (bacterial vaginosis)    4. Papanicolaou smear of cervix with atypical squamous cells cannot exclude high grade squamous intraepithelial lesion (ASC-H)    5. Screen for STD (sexually transmitted disease)             Plan:      Encounter for IUD removal  -     POCT urine pregnancy  -     Removal of Intrauterine Device  -     Pt was counseled on side-effects associated with Mirena use and on treatment options.  Pt states that symptoms have become highly disruptive and requests removal today.      Encounter for other contraceptive management  -     norgestimate-ethinyl estradiol (ORTHO TRI-CYCLEN,TRI-SPRINTEC) 0.18/0.215/0.25 mg-35 mcg (28) tablet; Take 1 tablet by mouth once daily.  Dispense: 28 tablet; Refill: 3  -      Pt was counseled on contraception options, including associated risks and benefits of each.  Pt voiced understanding and desires to proceed with OCP after IUD removal.  Medication dosing, side-effects, risks, benefits, and alternatives were discussed.  Medical history was reviewed and pt is a candidate for OCP use.    BV (bacterial vaginosis)  -     POCT Wet Prep  -     metroNIDAZOLE (FLAGYL) 500 MG tablet; Take 1 tablet (500 mg total) by mouth 2 (two) times daily.  Dispense: 14 tablet; Refill: 0  -     Medication dosing, side-effects, risks, and interactions were discussed.    Pap smear abnormality of cervix with ASC-H  -     Pt was lost to follow up.  Pt was counseled on importance of completing colposcopy evaluation of this result and on risks of continued non-compliance.  Pt voiced understanding and promises to return for colposcopy in near future.    Screen for STD (sexually transmitted disease)  -     C. trachomatis/N. gonorrhoeae by AMP DNA Cervix        Follow-up for Colposcopy.

## 2018-06-19 NOTE — PROCEDURES
Removal of Intrauterine Device  Date/Time: 6/19/2018 6:06 PM  Performed by: ABENA IQBAL.  Authorized by: ABENA IQBAL   Local anesthesia used: no    Anesthesia:  Local anesthesia used: no    Sedation:  Patient sedated: no  Patient tolerance: Patient tolerated the procedure well with no immediate complications  Comments: Alyssa Urena is a 27 y.o. female No obstetric history on file. presents for IUD removal because of persistent pain and irregular bleeding issues.  Desires OCP after removal.      PRE-IUD REMOVAL COUNSELING:  The patient was advised of minimal risks of bleeding and pain and she agrees to proceed.    PROCEDURE:  TIME OUT PERFORMED.  IUD strings were  visualized at the os and grasped. IUD removed with gentle traction.  The patient tolerated the procedure well.    ASSESSMENT:  Contraceptive Management / Removal IUD. V25.0.    POST IUD REMOVAL COUNSELING:  Expect period-like flow to occur after Mirena IUD removal and periods to return to pre-IUD pattern.  Manage post IUD removal cramping with NSAIDs, Tylenol or Rx per [facility].    POST IUD REMOVAL CONTRACEPTION:  If planning pregnancy, RX for prenatal vitamins.    Counseling lasted approximately 15 minutes and all her questions were answered.    FOLLOW-UP: see progress note for details

## 2018-06-20 LAB
C TRACH DNA SPEC QL NAA+PROBE: NOT DETECTED
N GONORRHOEA DNA SPEC QL NAA+PROBE: NOT DETECTED

## 2018-06-21 ENCOUNTER — PATIENT MESSAGE (OUTPATIENT)
Dept: OBSTETRICS AND GYNECOLOGY | Facility: CLINIC | Age: 28
End: 2018-06-21

## 2018-06-27 ENCOUNTER — LAB VISIT (OUTPATIENT)
Dept: LAB | Facility: HOSPITAL | Age: 28
End: 2018-06-27
Attending: PSYCHIATRY & NEUROLOGY
Payer: COMMERCIAL

## 2018-06-27 ENCOUNTER — OFFICE VISIT (OUTPATIENT)
Dept: PSYCHIATRY | Facility: CLINIC | Age: 28
End: 2018-06-27
Payer: COMMERCIAL

## 2018-06-27 ENCOUNTER — PATIENT MESSAGE (OUTPATIENT)
Dept: PSYCHIATRY | Facility: CLINIC | Age: 28
End: 2018-06-27

## 2018-06-27 DIAGNOSIS — F31.81 BIPOLAR 2 DISORDER: ICD-10-CM

## 2018-06-27 DIAGNOSIS — F31.81 BIPOLAR 2 DISORDER: Primary | ICD-10-CM

## 2018-06-27 LAB
ANION GAP SERPL CALC-SCNC: 6 MMOL/L
BUN SERPL-MCNC: 7 MG/DL
CALCIUM SERPL-MCNC: 9.5 MG/DL
CHLORIDE SERPL-SCNC: 106 MMOL/L
CO2 SERPL-SCNC: 27 MMOL/L
CREAT SERPL-MCNC: 0.9 MG/DL
EST. GFR  (AFRICAN AMERICAN): >60 ML/MIN/1.73 M^2
EST. GFR  (NON AFRICAN AMERICAN): >60 ML/MIN/1.73 M^2
GLUCOSE SERPL-MCNC: 90 MG/DL
POTASSIUM SERPL-SCNC: 3.9 MMOL/L
SODIUM SERPL-SCNC: 139 MMOL/L

## 2018-06-27 PROCEDURE — 80048 BASIC METABOLIC PNL TOTAL CA: CPT

## 2018-06-27 PROCEDURE — 99213 OFFICE O/P EST LOW 20 MIN: CPT | Mod: S$GLB,,, | Performed by: PSYCHIATRY & NEUROLOGY

## 2018-06-27 PROCEDURE — 36415 COLL VENOUS BLD VENIPUNCTURE: CPT | Mod: PO

## 2018-06-27 RX ORDER — LAMOTRIGINE 100 MG/1
TABLET ORAL
Qty: 30 TABLET | Refills: 2 | Status: SHIPPED | OUTPATIENT
Start: 2018-06-29 | End: 2018-11-08 | Stop reason: SDUPTHER

## 2018-06-27 RX ORDER — LITHIUM CARBONATE 300 MG/1
300 CAPSULE ORAL 2 TIMES DAILY
Qty: 60 CAPSULE | Refills: 2 | Status: SHIPPED | OUTPATIENT
Start: 2018-06-27 | End: 2018-06-27 | Stop reason: ALTCHOICE

## 2018-06-27 RX ORDER — LAMOTRIGINE 100 MG/1
TABLET ORAL
Qty: 30 TABLET | Refills: 1 | Status: SHIPPED | OUTPATIENT
Start: 2018-06-27 | End: 2018-06-27 | Stop reason: SDUPTHER

## 2018-06-27 RX ORDER — ZOLPIDEM TARTRATE 5 MG/1
TABLET ORAL
Qty: 30 TABLET | Refills: 1 | Status: SHIPPED | OUTPATIENT
Start: 2018-06-27 | End: 2018-06-27 | Stop reason: ALTCHOICE

## 2018-06-27 RX ORDER — HYDROXYZINE HYDROCHLORIDE 50 MG/1
TABLET, FILM COATED ORAL
Qty: 60 TABLET | Refills: 2 | Status: SHIPPED | OUTPATIENT
Start: 2018-06-27 | End: 2018-11-08 | Stop reason: ALTCHOICE

## 2018-06-27 NOTE — PROGRESS NOTES
"Outpatient Psychiatry Follow-up Visit (MD/NP)    6/27/2018    Alyssa Urena, a 27 y.o. female, presenting for follow-up visit. Met with patient.    Reason for Encounter: follow-up    IntervalHx: Patient seen & interviewed for follow-up, last seen about 2 months ago. Reports symptoms ongoing, some improvement from previously. Experiencing panic attacks but less frequently, more able to "breathe through them". They continue to be unprovoked, denies triggers from events or worries. Generally "veronica"/labile. Describes some hypomania since last visit. No hallucinations or delusions.  Health is ok. No new meds. No new stresses. Has been mostly adherent with medication but reports off venlafaxine er x 1 month. Denies panic attacks worse off the medication. May be cycling less.      Background: 25 y/o F referred by PA for mood lability. First presented to [facility] in February: 3 week complaint of stress and anxiety at work... states that her job they're understaffed and she feels overworked most of the time... anxiety is intermittent moderate to severe intensity feeling of being overwhelmed. She states sometimes she has to just go to a room by herself... associated trouble sleeping stating that she wakes up many times during the night wondering about job duties. She says she is also getting an abbreviated amount of sleep since she usually has been about 10 or 11 PM and wakes up almost every morning at 3 AM. Was started on venlafaxine. She requested & received an increased dose in March. She had an evaluation for unexplained CP and dizziness in June (workup was unrevealing). Re-presented in July for ongoing mood symptoms. From BRIDGETTE Cruz's note (highlights mine): 25 y/o female c/o mood swings X couple weeks. Pt does not have a PCP. She reports intermittent mood swings from feeling happy to feeling very sad & crying. She reports she doesn't know why they are happening & reports no trauma or recent changes in her " "life. No new stressors. She denies any SI/HI. She reports she lives with her parents & they are supportive. She reports bipolar disorder runs in her family and she is concerned she may have this. She is taking Effexor daily. She also had chest pain with associated spinning dizziness about 1 month ago & was seen by a different provider at that time. EKG & CXR were normal & TSH was slightly elevated - FT4 normal. She reports early CAD family history & has not seen a cardiologist. She reports chest pain (which occurred randomly) seems to have resolved & she denies any current CP. She has taken Antivert for her dizziness with relief. She reports no palpitations, fever, chills, cough, edema, exertional sxs, abd pain, chance of pregnancy, N/V, diaphoresis, sxs of infection, or other medical complaints. She reports she does not smoke. Confirms this hx today, reports "my mood swings are crazy". "I go from super happy to super depressed  I don't know why." Experiences "dark thoughts" including suicidal thoughts & fantasies, but no plans or intent. Denies new stressors identified to trigger moods. Denies episodes prior to recent months. Moods initially swung between euthymic, sad without provocation for weeks but then between euthymic, anxious & sad, & most recently have began to be mostly irritable with tinge of anxiety, featuring frequent racing thoughts whitout pressured speech. Drinks daily - "it calms me". More irritable without it, relaxed with it. No withdrawal symptoms on discontinuation. No tolerance. Bottle of wine daily x 1 year. Rare beer or liquor. Wasn't originally to bring self relief. Had one hangover. No morning alcohol. None during workday. No illicits. No prescription misuse. PsychHx: as above; denies otherwise. MedHx: Heart starts racing, short of breath, crying spells. Triggered by stress. Doesn't happen at home. Never happens for no reason at all. Meds: effexor since February or March - sebas better with " "anxiety. "not a complete breakdown". antivert - workup with PCP. FamHx: GM - ; no substance abuse problems. SocHx: reviewed. bipolar/scz, aunt bipolar disorder. Works as medical assistant for orthopedic surgeons, "takes a lot of the messages".    Review Of Systems:     GENERAL:  +weight gain  SKIN:  No rashes or lacerations  HEAD:  No headaches  CHEST:  No shortness of breath, hyperventilation or cough  CARDIOVASCULAR:  No tachycardia or chest pain  ABDOMEN:  No nausea, vomiting, pain, constipation or diarrhea  URINARY:  No frequency, dysuria or sexual dysfunction  MUSCULOSKELETAL:  No pain or stiffness of the joints  NEUROLOGIC:  No weakness, sensory changes, seizures, confusion, memory loss, tremor or other abnormal movements    Current Evaluation:     Nutritional Screening: Considering the patient's height and weight, medications, medical history and preferences, should a referral be made to the dietitian? no    Constitutional  Vitals:  Most recent vital signs, dated less than 90 days prior to this appointment, were not reviewed.    There were no vitals filed for this visit.     General:  unremarkable, age appropriate     Musculoskeletal  Muscle Strength/Tone:  no dystonia, no tremor   Gait & Station:  non-ataxic     Psychiatric  Appearance: casually dressed & groomed;   Behavior: calm,   Cooperation: cooperative with assessment  Speech: normal rate, volume, tone  Thought Process: linear, goal-directed  Thought Content: No suicidal or homicidal ideation; no delusions  Affect: reactive  Mood: euthymic  Perceptions: No auditory or visual hallucinations  Level of Consciousness: alert throughout interview  Insight: fair  Cognition: Oriented to person, place, time, & situation  Memory: no apparent deficits to general clinical interview; not formally assessed  Attention/Concentration: no apparent deficits to general clinical interview; not formally assessed  Fund of Knowledge: average by " vocabulary/education    Laboratory Data  Office Visit on 06/19/2018   Component Date Value Ref Range Status    Chlamydia, Amplified DNA 06/19/2018 Not Detected  Not Detected Final    N gonorrhoeae, amplified DNA 06/19/2018 Not Detected  Not Detected Final     Medications  Outpatient Encounter Prescriptions as of 6/27/2018   Medication Sig Dispense Refill    lamoTRIgine (LAMICTAL) 100 MG tablet Take 1 tablet daily after completing 25 mg bottle. 30 tablet 1    LORazepam (ATIVAN) 1 MG tablet Take 1 tablet (1 mg total) by mouth daily as needed (panic attack). 15 tablet 0    metroNIDAZOLE (FLAGYL) 500 MG tablet Take 1 tablet (500 mg total) by mouth 2 (two) times daily. 14 tablet 0    norgestimate-ethinyl estradiol (ORTHO TRI-CYCLEN,TRI-SPRINTEC) 0.18/0.215/0.25 mg-35 mcg (28) tablet Take 1 tablet by mouth once daily. 28 tablet 3    traZODone (DESYREL) 100 MG tablet Take 1/2 to 1 tablet at bedtime as needed for sleep. 30 tablet 2    venlafaxine (EFFEXOR-XR) 75 MG 24 hr capsule Take 2 capsules (150 mg total) by mouth once daily. 60 capsule 2     No facility-administered encounter medications on file as of 6/27/2018.      Assessment - Diagnosis - Goals:     Impression: intermittent mood episodes starting with anxiety but later featuring prolonged considerably lability, low moods, suicidal thoughts, irritability. Benefited from quetiapine but experienced considerable weight gain unresponsive to behavioral changes. Had several unprovoked panic attacks. Weight gain reduced on lurasidone but still present and therapeutic benefits were ok. Tolerating lamotrigine ok, some benefits.     Dx: bipolar 2 disorder    Treatment Goals:  Specify outcomes written in observable, behavioral terms:   Decrease mood lability, foster euthymia by self-report, questionnaires    Treatment Plan/Recommendations:   · Lamotrigine 100 mg daily. hydroxyzine prn sleep. Ativan 1 mg daily prn panic attack. Discussed risks, benefits, & alternatives  to treatment plan documented above with patient. I answered all patient questions related to this plan & patient expressed understanding & agreement. Stop lamotrigine in case of new rash. She's agreeable to this.   · F/u, 2 months.     Return to Clinic: 2 months    Counseling time: 5 minutes  Total time: 20 minutes    HIMA Guillen MD

## 2018-06-30 ENCOUNTER — OFFICE VISIT (OUTPATIENT)
Dept: URGENT CARE | Facility: CLINIC | Age: 28
End: 2018-06-30
Payer: COMMERCIAL

## 2018-06-30 VITALS
OXYGEN SATURATION: 99 % | BODY MASS INDEX: 35.31 KG/M2 | RESPIRATION RATE: 16 BRPM | HEIGHT: 64 IN | SYSTOLIC BLOOD PRESSURE: 117 MMHG | DIASTOLIC BLOOD PRESSURE: 77 MMHG | HEART RATE: 84 BPM | TEMPERATURE: 99 F | WEIGHT: 206.81 LBS

## 2018-06-30 DIAGNOSIS — B37.9 YEAST INFECTION: Primary | ICD-10-CM

## 2018-06-30 PROCEDURE — 3008F BODY MASS INDEX DOCD: CPT | Mod: CPTII,S$GLB,, | Performed by: NURSE PRACTITIONER

## 2018-06-30 PROCEDURE — 99214 OFFICE O/P EST MOD 30 MIN: CPT | Mod: S$GLB,,, | Performed by: NURSE PRACTITIONER

## 2018-06-30 PROCEDURE — 99999 PR PBB SHADOW E&M-EST. PATIENT-LVL III: CPT | Mod: PBBFAC,,, | Performed by: NURSE PRACTITIONER

## 2018-06-30 RX ORDER — FLUCONAZOLE 150 MG/1
150 TABLET ORAL DAILY
Qty: 1 TABLET | Refills: 1 | Status: SHIPPED | OUTPATIENT
Start: 2018-06-30 | End: 2018-07-01

## 2018-06-30 NOTE — PATIENT INSTRUCTIONS
Vaginal Infection: Understanding the Vaginal Environment  The vagina is a canal. It connects the uterus (womb) to the outside of the body. It is home to many types of bacteria and other tiny organisms. These different bacteria most often stay balanced in number. This keeps the vagina healthy. If the balance changes, it can cause infection.   A healthy environment  Many types of bacteria are present in a healthy vagina. When balanced, they dont cause problems. Small amounts of yeast may also be present without causing problems. The most common type of bacteria in the vagina is lactobacillus. It helps keep the vagina at a low pH. A low pH keeps bad bacteria from taking over.  Normal vaginal discharge  The vagina makes fluid. It is sent out as discharge. This also keeps the vagina healthy. Normal discharge can be clear, white, or yellowish. Most women find that normal discharge varies in amount and color through the month.  An unhealthy environment  The vaginal environment may get out of balance. This may result in a vaginal infection. There are a few reasons this can happen. The pH may have changed. The amount of one organism, such as yeast, may increase. Or an outside organism may get into the vagina and throw off the balance:  · Bacterial vaginosis (BV). BV is due to an imbalance in the normal bacteria in the vagina. Lactobacillus bacteria decrease. As a result, the numbers of bad bacteria increase.  · Candidiasis (yeast infection). Yeast is a type of fungus. A yeast infection occurs when yeast cells in the vagina increase. They then attack vaginal tissues. A type of yeast called Candida albicans is often involved.  · Trichomoniasis (trich). Trich is a parasite. It is passed from one person to another during sex. Men with trich often dont have any symptoms. In women, it can take weeks or months before symptoms appear.  Date Last Reviewed: 3/1/2017  © 2213-2754 [employer]. [address] [place] [address],  BRIDGETTE Rao [address]. All rights reserved. This information is not intended as a substitute for professional medical care. Always follow your healthcare professional's instructions.

## 2018-06-30 NOTE — PROGRESS NOTES
Subjective:       Patient ID: Alyssa Urena is a 27 y.o. female.    Chief Complaint: Vaginitis    Vaginal Itching   The patient's primary symptoms include genital itching and vaginal discharge. The patient's pertinent negatives include no genital lesions, genital odor, genital rash, missed menses, pelvic pain or vaginal bleeding. This is a new problem. The current episode started in the past 7 days. The problem occurs constantly. The problem has been unchanged. She is not pregnant. Pertinent negatives include no abdominal pain, constipation, dysuria, fever, flank pain, frequency, hematuria or joint swelling. The vaginal discharge was white. There has been no bleeding. She has not been passing clots. She has not been passing tissue. Nothing aggravates the symptoms. She has tried nothing for the symptoms. Her past medical history is significant for vaginosis. There is no history of PID or an STD.     Review of Systems   Constitutional: Negative for fatigue and fever.   Gastrointestinal: Negative for abdominal pain and constipation.   Genitourinary: Positive for vaginal discharge and vaginal pain. Negative for dysuria, flank pain, frequency, hematuria, missed menses and pelvic pain.        Vaginal itching   Neurological: Negative for dizziness.   Psychiatric/Behavioral: Negative for agitation.       Objective:      Physical Exam   Constitutional: She is oriented to person, place, and time. She appears well-developed and well-nourished.   Genitourinary:   Genitourinary Comments: deferred   Neurological: She is alert and oriented to person, place, and time.   Skin: Skin is warm.   Psychiatric: She has a normal mood and affect.   Nursing note and vitals reviewed.      Assessment:       1. Yeast infection        Plan:         Alyssa was seen today for vaginitis.    Diagnoses and all orders for this visit:    Yeast infection  -     fluconazole (DIFLUCAN) 150 MG Tab; Take 1 tablet (150 mg total) by mouth once  daily. for 1 day    Will treat based upon symptoms. Pap smear and STD check is current.  Recent use of antibiotics as precursor.  Discussed home treatment and care.   Follow prescribed treatment plan as directed.  Stay hydrated and rest.  Report to ER if symptoms worsen.  Follow up with PCP in 2-3 days or sooner if symptoms do not improve.

## 2018-07-01 ENCOUNTER — HOSPITAL ENCOUNTER (EMERGENCY)
Facility: HOSPITAL | Age: 28
Discharge: HOME | End: 2018-07-01
Payer: COMMERCIAL

## 2018-07-01 VITALS
BODY MASS INDEX: 34.27 KG/M2 | WEIGHT: 205.69 LBS | HEIGHT: 65 IN | DIASTOLIC BLOOD PRESSURE: 85 MMHG | RESPIRATION RATE: 18 BRPM | HEART RATE: 78 BPM | SYSTOLIC BLOOD PRESSURE: 134 MMHG | TEMPERATURE: 98 F | OXYGEN SATURATION: 99 %
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
## 2018-07-01 DIAGNOSIS — S60.449A TIGHT RING ON FINGER: Primary | ICD-10-CM

## 2018-07-01 PROCEDURE — 99283 EMERGENCY DEPT VISIT LOW MDM: CPT

## 2018-07-01 NOTE — ED PROVIDER NOTES
History      Chief Complaint   Patient presents with    Hand Pain     engagement ring stuck on finger since last night        Review of patient's allergies indicates:   Allergen Reactions    Peanut Hives        HPI   HPI    7/1/2018, 2:46 PM   History obtained from the patient       History of Present Illness: Alyssa Urena is a 27 y.o. female patient who presents to the Emergency Department for engagement ring stuck on finger since last night.  She just started wearing last night for first time.  She has tried icing finger, soap, floss, with no result.  Denies injury.  Symptoms are moderate in severity.     No further complaints or concerns at this time.           PCP: Jeff Stevens MD       Past Medical History:  Past Medical History:   Diagnosis Date    Chest pain syndrome 8/18/2017    FH: CAD (coronary artery disease) 8/18/2017         Past Surgical History:  Past Surgical History:   Procedure Laterality Date    OOPHORECTOMY  2014           Family History:  Family History   Problem Relation Age of Onset    Cataracts Maternal Grandmother     Diabetes Maternal Grandmother     Cataracts Maternal Grandfather     Glaucoma Maternal Grandfather     Hypertension Mother     Coronary artery disease Father         MI age 40s    Coronary artery disease Paternal Uncle         age 30s    Cataracts Paternal Uncle            Social History:  Social History     Social History Main Topics    Smoking status: Never Smoker    Smokeless tobacco: Never Used    Alcohol use No    Drug use: No    Sexual activity: Yes     Partners: Male     Birth control/ protection: None       ROS     Review of Systems   Constitutional: Negative for chills and fever.   HENT: Negative for sore throat.    Respiratory: Negative for shortness of breath.    Cardiovascular: Negative for chest pain.   Gastrointestinal: Negative for nausea.   Genitourinary: Negative for dysuria.   Musculoskeletal: Negative for back pain.  "  Skin: Negative for rash.   Neurological: Negative for weakness.   Hematological: Does not bruise/bleed easily.   All other systems reviewed and are negative.      Physical Exam      Initial Vitals [07/01/18 1436]   BP Pulse Resp Temp SpO2   134/85 78 18 98.2 °F (36.8 °C) 99 %      MAP       --         Physical Exam  Vital signs and nursing notes reviewed.  Constitutional: Patient is in NAD. Awake and alert. Well-developed and well-nourished.  Head: Atraumatic. Normocephalic.  Eyes: PERRL. EOM intact. Conjunctivae nl. No scleral icterus.  ENT: Mucous membranes are moist. Oropharynx is clear.  Neck: Supple. No JVD. No lymphadenopathy.  No meningismus  Cardiovascular: Regular rate and rhythm. No murmurs, rubs, or gallops. Distal pulses are 2+ and symmetric.  Pulmonary/Chest: No respiratory distress. Clear to auscultation bilaterally. No wheezing, rales, or rhonchi.  Abdominal: Soft. Non-distended. No TTP. No rebound, guarding, or rigidity. Good bowel sounds.  Genitourinary: No CVA tenderness  Musculoskeletal: Moves all extremities. Left ring finger with tight ring in place.  FROM.  Normal sensation, and cap refill less than 2  Skin: Warm and dry.  Neurological: Awake and alert. No acute focal neurological deficits are appreciated.  Psychiatric: Normal affect. Good eye contact. Appropriate in content.      ED Course          Procedures  ED Vital Signs:  Vitals:    07/01/18 1436   BP: 134/85   Pulse: 78   Resp: 18   Temp: 98.2 °F (36.8 °C)   TempSrc: Oral   SpO2: 99%   Weight: 93.3 kg (205 lb 11 oz)   Height: 5' 5" (1.651 m)         Ring removed with ring cutter        Imaging Results:  Imaging Results    None            The Emergency Provider reviewed the vital signs and test results, which are outlined above.    ED Discussion             Medication(s) given in the ER:  Medications - No data to display        Follow-up Information     Jeff Stevens MD In 2 days.    Specialty:  Family Medicine  Contact " information:  [address]  [place] [address]  [phone]                       Discharge Medication List as of 7/1/2018  3:22 PM             Medical Decision Making        All findings were reviewed with the patient/family in detail.   All remaining questions and concerns were addressed at that time.  Patient/family has been counseled regarding the need for follow-up as well as the indication to return to the emergency room should new or worrisome developments occur.        MDM               Clinical Impression:        ICD-10-CM ICD-9-CM   1. Tight ring on finger S60.449A 915.8    W49.04XA E928.5             Suri Andrews PA-C  07/01/18 2243

## 2018-07-06 ENCOUNTER — OFFICE VISIT (OUTPATIENT)
Dept: INTERNAL MEDICINE | Facility: CLINIC | Age: 28
End: 2018-07-06
Payer: COMMERCIAL

## 2018-07-06 ENCOUNTER — HOSPITAL ENCOUNTER (OUTPATIENT)
Dept: RADIOLOGY | Facility: HOSPITAL | Age: 28
Discharge: HOME | End: 2018-07-06
Attending: NURSE PRACTITIONER
Payer: COMMERCIAL

## 2018-07-06 VITALS
HEIGHT: 65 IN | DIASTOLIC BLOOD PRESSURE: 70 MMHG | BODY MASS INDEX: 34.75 KG/M2 | WEIGHT: 208.56 LBS | OXYGEN SATURATION: 98 % | TEMPERATURE: 99 F | HEART RATE: 97 BPM | RESPIRATION RATE: 16 BRPM | SYSTOLIC BLOOD PRESSURE: 120 MMHG

## 2018-07-06 DIAGNOSIS — R10.31 RIGHT LOWER QUADRANT ABDOMINAL PAIN: ICD-10-CM

## 2018-07-06 DIAGNOSIS — R10.31 RIGHT LOWER QUADRANT ABDOMINAL PAIN: Primary | ICD-10-CM

## 2018-07-06 LAB
B-HCG UR QL: NEGATIVE
BACTERIA #/AREA URNS HPF: ABNORMAL /HPF
BILIRUB UR QL STRIP: NEGATIVE
CLARITY UR: CLEAR
COLOR UR: YELLOW
CTP QC/QA: YES
GLUCOSE UR QL STRIP: NEGATIVE
HGB UR QL STRIP: NEGATIVE
KETONES UR QL STRIP: NEGATIVE
LEUKOCYTE ESTERASE UR QL STRIP: ABNORMAL
MICROSCOPIC COMMENT: ABNORMAL
NITRITE UR QL STRIP: NEGATIVE
PH UR STRIP: 6 [PH] (ref 5–8)
PROT UR QL STRIP: NEGATIVE
SP GR UR STRIP: 1.02 (ref 1–1.03)
URN SPEC COLLECT METH UR: ABNORMAL
WBC #/AREA URNS HPF: 4 /HPF (ref 0–5)

## 2018-07-06 PROCEDURE — 81025 URINE PREGNANCY TEST: CPT | Mod: S$GLB,,, | Performed by: NURSE PRACTITIONER

## 2018-07-06 PROCEDURE — 99214 OFFICE O/P EST MOD 30 MIN: CPT | Mod: S$GLB,,, | Performed by: NURSE PRACTITIONER

## 2018-07-06 PROCEDURE — 3008F BODY MASS INDEX DOCD: CPT | Mod: CPTII,S$GLB,, | Performed by: NURSE PRACTITIONER

## 2018-07-06 PROCEDURE — 74019 RADEX ABDOMEN 2 VIEWS: CPT | Mod: TC,FY,PO

## 2018-07-06 PROCEDURE — 99999 PR PBB SHADOW E&M-EST. PATIENT-LVL IV: CPT | Mod: PBBFAC,,, | Performed by: NURSE PRACTITIONER

## 2018-07-06 PROCEDURE — 81000 URINALYSIS NONAUTO W/SCOPE: CPT | Mod: PO

## 2018-07-06 PROCEDURE — 74019 RADEX ABDOMEN 2 VIEWS: CPT | Mod: 26,,, | Performed by: RADIOLOGY

## 2018-07-06 RX ORDER — ACYCLOVIR 400 MG/1
400 TABLET ORAL
Qty: 30 TABLET | Refills: 0 | Status: SHIPPED | OUTPATIENT
Start: 2018-07-06 | End: 2019-05-07 | Stop reason: SDUPTHER

## 2018-07-06 NOTE — PROGRESS NOTES
Subjective:       Patient ID: Alyssa Urena is a 27 y.o. female.    Chief Complaint: Abdominal Pain    Abdominal Pain   This is a new problem. The current episode started 1 to 4 weeks ago. The onset quality is gradual. The problem occurs intermittently. The problem has been waxing and waning. The pain is located in the RLQ and suprapubic region. The pain is at a severity of 5/10. The pain is moderate. The quality of the pain is aching. The abdominal pain radiates to the right flank. Pertinent negatives include no anorexia, arthralgias, belching, constipation, diarrhea, dysuria, fever, flatus, frequency, headaches, hematochezia, hematuria, melena, myalgias, nausea, vomiting or weight loss. Nothing aggravates the pain. The pain is relieved by nothing. There is no history of abdominal surgery, colon cancer, Crohn's disease, gallstones, GERD, irritable bowel syndrome, pancreatitis, PUD or ulcerative colitis. Patient's medical history does not include kidney stones.           Past Medical History:   Diagnosis Date    Chest pain syndrome 8/18/2017    FH: CAD (coronary artery disease) 8/18/2017     Past Surgical History:   Procedure Laterality Date    INTRAUTERINE DEVICE INSERTION      Mirena removed 06/2018    OOPHORECTOMY Left 2014     Past Surgical History:   Procedure Laterality Date    INTRAUTERINE DEVICE INSERTION      Mirena removed 06/2018    OOPHORECTOMY Left 2014     Social History     Social History    Marital status: Single     Spouse name: N/A    Number of children: N/A    Years of education: N/A     Occupational History    Not on file.     Social History Main Topics    Smoking status: Never Smoker    Smokeless tobacco: Never Used    Alcohol use No    Drug use: No    Sexual activity: Yes     Partners: Male     Birth control/ protection: None     Other Topics Concern    Not on file     Social History Narrative    No smokers in household, 1 dog.     Review of patient's allergies  indicates:   Allergen Reactions    Celebrex [celecoxib] Shortness Of Breath    Peanut Hives     Current Outpatient Prescriptions   Medication Sig    hydrOXYzine (ATARAX) 50 MG tablet Take 1 to 2 tablets by mouth at bedtime as needed for sleep    lamoTRIgine (LAMICTAL) 100 MG tablet Take 1 tablet by mouth daily    LORazepam (ATIVAN) 1 MG tablet Take 1 tablet (1 mg total) by mouth daily as needed (panic attack).    prenatal no122/iron/folic acid (PRENATAL MULTI ORAL) Take 1 tablet by mouth once daily.    acyclovir (ZOVIRAX) 400 MG tablet Take 1 tablet (400 mg total) by mouth 3 (three) times daily with meals. for 10 days    norgestimate-ethinyl estradiol (ORTHO TRI-CYCLEN,TRI-SPRINTEC) 0.18/0.215/0.25 mg-35 mcg (28) tablet Take 1 tablet by mouth once daily.     No current facility-administered medications for this visit.            Review of Systems   Constitutional: Negative for activity change, appetite change, chills, diaphoresis, fatigue, fever, unexpected weight change and weight loss.   HENT: Negative for congestion, ear pain, hearing loss, postnasal drip, rhinorrhea, sinus pain, sinus pressure, sneezing, sore throat, tinnitus, trouble swallowing and voice change.    Eyes: Negative for photophobia, pain, discharge and visual disturbance.   Respiratory: Negative for cough, chest tightness, shortness of breath and wheezing.    Cardiovascular: Negative for chest pain, palpitations and leg swelling.   Gastrointestinal: Positive for abdominal pain. Negative for abdominal distention, anorexia, blood in stool, constipation, diarrhea, flatus, hematochezia, melena, nausea and vomiting.   Endocrine: Negative for polydipsia and polyuria.   Genitourinary: Negative for difficulty urinating, dysuria, frequency, hematuria and menstrual problem.   Musculoskeletal: Negative for arthralgias, back pain, joint swelling, myalgias, neck pain and neck stiffness.   Skin:        Herpetic genital outbreak    Allergic/Immunologic:  Negative for immunocompromised state.   Neurological: Negative for dizziness, tremors, seizures, syncope, facial asymmetry, speech difficulty, weakness, light-headedness, numbness and headaches.   Hematological: Negative for adenopathy. Does not bruise/bleed easily.   Psychiatric/Behavioral: Negative for confusion, dysphoric mood and sleep disturbance.       Objective:      Physical Exam   Constitutional: She is oriented to person, place, and time.   HENT:   Head: Normocephalic and atraumatic.   Right Ear: Tympanic membrane normal.   Left Ear: Tympanic membrane normal.   Eyes: Conjunctivae and EOM are normal.   Neck: Normal range of motion. Neck supple.   Cardiovascular: Normal rate, regular rhythm, normal heart sounds and intact distal pulses.    Pulmonary/Chest: Effort normal and breath sounds normal.   Abdominal: Soft. Bowel sounds are normal. There is tenderness in the right lower quadrant. There is CVA tenderness (right side/mild ).       Genitourinary:   Genitourinary Comments: Deferred    Musculoskeletal: Normal range of motion.   Neurological: She is alert and oriented to person, place, and time.   Skin: Skin is warm and dry.   Psychiatric: She has a normal mood and affect.       Assessment:     Vitals:    07/06/18 1305   BP: 120/70   Pulse: 97   Resp: 16   Temp: 98.5 °F (36.9 °C)         1. Right lower quadrant abdominal pain        Plan:   Right lower quadrant abdominal pain  -     X-Ray Abdomen Flat And Erect; Future; Expected date: 07/06/2018  -     POCT Urine Pregnancy  -     Urinalysis; Future; Expected date: 07/06/2018  -     Comprehensive metabolic panel; Future; Expected date: 07/06/2018  -     CBC auto differential; Future; Expected date: 07/06/2018  -     Amylase; Future; Expected date: 07/06/2018  -     Lipase; Future; Expected date: 07/06/2018  -     Hepatitis panel, acute; Future; Expected date: 07/06/2018    Other orders  -     acyclovir (ZOVIRAX) 400 MG tablet; Take 1 tablet (400 mg total) by  mouth 3 (three) times daily with meals. for 10 days  Dispense: 30 tablet; Refill: 0          As above  Review to follow

## 2018-10-03 ENCOUNTER — PATIENT MESSAGE (OUTPATIENT)
Dept: PSYCHIATRY | Facility: CLINIC | Age: 28
End: 2018-10-03

## 2018-10-23 ENCOUNTER — PATIENT MESSAGE (OUTPATIENT)
Dept: OBSTETRICS AND GYNECOLOGY | Facility: CLINIC | Age: 28
End: 2018-10-23

## 2018-10-23 NOTE — TELEPHONE ENCOUNTER
Called and spoke to patient.  Patient stated that she got a positive pregnancy test three weeks ago, and yesterday she started bleeding heavily with clots and pain.  Patient wanted to know what she could do to alleviate the pain.  Informed patient that she could take ibuprofen and tylenol.  Patient stated that the tylenol only dulls the pain a little bit, and that it also makes her nauseous.  Informed patient to try ibuprofen, and if she needs she can take up to 800mg (4 - 200mg) every 6 hours if needed.  Informed patient that she could bleed, and have pain for up to a week.  Informed patient that if the pain becomes unbearable to go to the ER.  Patient verbalized understanding.

## 2018-10-24 RX ORDER — QUETIAPINE FUMARATE 100 MG/1
TABLET, FILM COATED ORAL
Qty: 30 TABLET | Refills: 1 | OUTPATIENT
Start: 2018-10-24

## 2018-11-08 ENCOUNTER — OFFICE VISIT (OUTPATIENT)
Dept: PSYCHIATRY | Facility: CLINIC | Age: 28
End: 2018-11-08
Payer: COMMERCIAL

## 2018-11-08 DIAGNOSIS — F41.9 ANXIETY: ICD-10-CM

## 2018-11-08 DIAGNOSIS — F31.81 BIPOLAR 2 DISORDER: Primary | Chronic | ICD-10-CM

## 2018-11-08 PROCEDURE — 99214 OFFICE O/P EST MOD 30 MIN: CPT | Mod: S$GLB,,, | Performed by: PSYCHIATRY & NEUROLOGY

## 2018-11-08 PROCEDURE — 99999 PR PBB SHADOW E&M-EST. PATIENT-LVL I: CPT | Mod: PBBFAC,,, | Performed by: PSYCHIATRY & NEUROLOGY

## 2018-11-08 RX ORDER — LAMOTRIGINE 100 MG/1
TABLET ORAL
Qty: 30 TABLET | Refills: 2 | Status: SHIPPED | OUTPATIENT
Start: 2018-11-08 | End: 2019-01-22 | Stop reason: SDUPTHER

## 2018-11-08 RX ORDER — TRAZODONE HYDROCHLORIDE 100 MG/1
TABLET ORAL
Qty: 30 TABLET | Refills: 2 | Status: SHIPPED | OUTPATIENT
Start: 2018-11-08 | End: 2019-11-26

## 2018-11-08 RX ORDER — VENLAFAXINE HYDROCHLORIDE 75 MG/1
CAPSULE, EXTENDED RELEASE ORAL
Qty: 60 CAPSULE | Refills: 2 | Status: SHIPPED | OUTPATIENT
Start: 2018-11-08 | End: 2019-01-22 | Stop reason: SDUPTHER

## 2018-11-08 NOTE — PROGRESS NOTES
Outpatient Psychiatry Follow-up Visit (MD/NP)    11/8/2018    Alyssa Urena, a 27 y.o. female, presenting for follow-up visit. Met with patient.    Reason for Encounter: follow-up    IntervalHx: Patient seen & interviewed for follow-up, last seen about 2 months ago. Reports having had more frequent panic attacks recently - mostly unprovoked. Problems with occupational functioning - concentration and mood lability. Sleep problems. Crying more, but good interest. Health otherwise overall ok. Adherent to lamictal. Can't take ativan at work, but works when she takes it. No new mental symptoms. Denies adverse stressors. Getting , preparing for wedding. Looking forward to it. Hydroxyzine hasn't helped for sleep.     Background: 25 y/o F referred by PA for mood lability. First presented to [facility] in February: 3 week complaint of stress and anxiety at work... states that her job they're understaffed and she feels overworked most of the time... anxiety is intermittent moderate to severe intensity feeling of being overwhelmed. She states sometimes she has to just go to a room by herself... associated trouble sleeping stating that she wakes up many times during the night wondering about job duties. She says she is also getting an abbreviated amount of sleep since she usually has been about 10 or 11 PM and wakes up almost every morning at 3 AM. Was started on venlafaxine. She requested & received an increased dose in March. She had an evaluation for unexplained CP and dizziness in June (workup was unrevealing). Re-presented in July for ongoing mood symptoms. From BRIDGETTE Cruz's note (highlights mine): 25 y/o female c/o mood swings X couple weeks. Pt does not have a PCP. She reports intermittent mood swings from feeling happy to feeling very sad & crying. She reports she doesn't know why they are happening & reports no trauma or recent changes in her life. No new stressors. She denies any SI/HI. She reports  "she lives with her parents & they are supportive. She reports bipolar disorder runs in her family and she is concerned she may have this. She is taking Effexor daily. She also had chest pain with associated spinning dizziness about 1 month ago & was seen by a different provider at that time. EKG & CXR were normal & TSH was slightly elevated - FT4 normal. She reports early CAD family history & has not seen a cardiologist. She reports chest pain (which occurred randomly) seems to have resolved & she denies any current CP. She has taken Antivert for her dizziness with relief. She reports no palpitations, fever, chills, cough, edema, exertional sxs, abd pain, chance of pregnancy, N/V, diaphoresis, sxs of infection, or other medical complaints. She reports she does not smoke. Confirms this hx today, reports "my mood swings are crazy". "I go from super happy to super depressed  I don't know why." Experiences "dark thoughts" including suicidal thoughts & fantasies, but no plans or intent. Denies new stressors identified to trigger moods. Denies episodes prior to recent months. Moods initially swung between euthymic, sad without provocation for weeks but then between euthymic, anxious & sad, & most recently have began to be mostly irritable with tinge of anxiety, featuring frequent racing thoughts whitout pressured speech. Drinks daily - "it calms me". More irritable without it, relaxed with it. No withdrawal symptoms on discontinuation. No tolerance. Bottle of wine daily x 1 year. Rare beer or liquor. Wasn't originally to bring self relief. Had one hangover. No morning alcohol. None during workday. No illicits. No prescription misuse. PsychHx: as above; denies otherwise. MedHx: Heart starts racing, short of breath, crying spells. Triggered by stress. Doesn't happen at home. Never happens for no reason at all. Meds: effexor since February or March - sebas better with anxiety. "not a complete breakdown". antivert - workup " "with PCP. FamHx: GM - ; no substance abuse problems. SocHx: reviewed. bipolar/scz, aunt bipolar disorder. Works as medical assistant for orthopedic surgeons, "takes a lot of the messages".    Review Of Systems:     GENERAL:  +weight gain  SKIN:  No rashes or lacerations  HEAD:  No headaches  CHEST:  No shortness of breath, hyperventilation or cough  CARDIOVASCULAR:  No tachycardia or chest pain  ABDOMEN:  No nausea, vomiting, pain, constipation or diarrhea  URINARY:  No frequency, dysuria or sexual dysfunction  MUSCULOSKELETAL:  No pain or stiffness of the joints  NEUROLOGIC:  No weakness, sensory changes, seizures, confusion, memory loss, tremor or other abnormal movements    Current Evaluation:     Nutritional Screening: Considering the patient's height and weight, medications, medical history and preferences, should a referral be made to the dietitian? no    Constitutional  Vitals:  Most recent vital signs, dated less than 90 days prior to this appointment, were not reviewed.    There were no vitals filed for this visit.     General:  unremarkable, age appropriate     Musculoskeletal  Muscle Strength/Tone:  no dystonia, no tremor   Gait & Station:  non-ataxic     Psychiatric  Appearance: casually dressed & groomed;   Behavior: calm,   Cooperation: cooperative with assessment  Speech: normal rate, volume, tone  Thought Process: linear, goal-directed  Thought Content: No suicidal or homicidal ideation; no delusions  Affect: anxious  Mood: anxious  Perceptions: No auditory or visual hallucinations  Level of Consciousness: alert throughout interview  Insight: fair  Cognition: Oriented to person, place, time, & situation  Memory: no apparent deficits to general clinical interview; not formally assessed  Attention/Concentration: no apparent deficits to general clinical interview; not formally assessed  Fund of Knowledge: average by vocabulary/education    Laboratory Data  No visits with results within 1 Month(s) from " this visit.   Latest known visit with results is:   Lab Visit on 07/06/2018   Component Date Value Ref Range Status    Sodium 07/06/2018 139  136 - 145 mmol/L Final    Potassium 07/06/2018 3.6  3.5 - 5.1 mmol/L Final    Chloride 07/06/2018 105  95 - 110 mmol/L Final    CO2 07/06/2018 24  23 - 29 mmol/L Final    Glucose 07/06/2018 89  70 - 110 mg/dL Final    BUN, Bld 07/06/2018 5* 6 - 20 mg/dL Final    Creatinine 07/06/2018 0.9  0.5 - 1.4 mg/dL Final    Calcium 07/06/2018 9.5  8.7 - 10.5 mg/dL Final    Total Protein 07/06/2018 8.2  6.0 - 8.4 g/dL Final    Albumin 07/06/2018 4.0  3.5 - 5.2 g/dL Final    Total Bilirubin 07/06/2018 0.5  0.1 - 1.0 mg/dL Final    Alkaline Phosphatase 07/06/2018 48* 55 - 135 U/L Final    AST 07/06/2018 13  10 - 40 U/L Final    ALT 07/06/2018 17  10 - 44 U/L Final    Anion Gap 07/06/2018 10  8 - 16 mmol/L Final    eGFR if African American 07/06/2018 >60  >60 mL/min/1.73 m^2 Final    eGFR if non African American 07/06/2018 >60  >60 mL/min/1.73 m^2 Final    WBC 07/06/2018 4.03  3.90 - 12.70 K/uL Final    RBC 07/06/2018 4.19  4.00 - 5.40 M/uL Final    Hemoglobin 07/06/2018 12.7  12.0 - 16.0 g/dL Final    Hematocrit 07/06/2018 38.6  37.0 - 48.5 % Final    MCV 07/06/2018 92  82 - 98 fL Final    MCH 07/06/2018 30.3  27.0 - 31.0 pg Final    MCHC 07/06/2018 32.9  32.0 - 36.0 g/dL Final    RDW 07/06/2018 12.4  11.5 - 14.5 % Final    Platelets 07/06/2018 215  150 - 350 K/uL Final    MPV 07/06/2018 10.9  9.2 - 12.9 fL Final    Gran # (ANC) 07/06/2018 2.1  1.8 - 7.7 K/uL Final    Lymph # 07/06/2018 1.4  1.0 - 4.8 K/uL Final    Mono # 07/06/2018 0.4  0.3 - 1.0 K/uL Final    Eos # 07/06/2018 0.1  0.0 - 0.5 K/uL Final    Baso # 07/06/2018 0.02  0.00 - 0.20 K/uL Final    Gran% 07/06/2018 51.1  38.0 - 73.0 % Final    Lymph% 07/06/2018 34.7  18.0 - 48.0 % Final    Mono% 07/06/2018 10.7  4.0 - 15.0 % Final    Eosinophil% 07/06/2018 3.0  0.0 - 8.0 % Final    Basophil%  07/06/2018 0.5  0.0 - 1.9 % Final    Differential Method 07/06/2018 Automated   Final    Amylase 07/06/2018 96  20 - 110 U/L Final    Lipase 07/06/2018 43  4 - 60 U/L Final    Hepatitis B Surface Ag 07/06/2018 Negative   Final    Hep B C IgM 07/06/2018 Negative   Final    Hep A IgM 07/06/2018 Negative   Final    Hepatitis C Ab 07/06/2018 Negative   Final     Medications  Outpatient Encounter Medications as of 11/8/2018   Medication Sig Dispense Refill    acyclovir (ZOVIRAX) 400 MG tablet Take 1 tablet (400 mg total) by mouth 3 (three) times daily with meals. for 10 days 30 tablet 0    hydrOXYzine (ATARAX) 50 MG tablet Take 1 to 2 tablets by mouth at bedtime as needed for sleep 60 tablet 2    lamoTRIgine (LAMICTAL) 100 MG tablet Take 1 tablet by mouth daily 30 tablet 2    LORazepam (ATIVAN) 1 MG tablet Take 1 tablet (1 mg total) by mouth daily as needed (panic attack). 15 tablet 0    norgestimate-ethinyl estradiol (ORTHO TRI-CYCLEN,TRI-SPRINTEC) 0.18/0.215/0.25 mg-35 mcg (28) tablet Take 1 tablet by mouth once daily. 28 tablet 3    prenatal no122/iron/folic acid (PRENATAL MULTI ORAL) Take 1 tablet by mouth once daily.       No facility-administered encounter medications on file as of 11/8/2018.      Assessment - Diagnosis - Goals:     Impression: intermittent mood episodes starting with anxiety but later featuring prolonged considerably lability, low moods, suicidal thoughts, irritability. Benefited from quetiapine but experienced considerable weight gain unresponsive to behavioral changes. Had several unprovoked panic attacks. Weight gain reduced on lurasidone but still present and therapeutic benefits were ok. Tolerating lamotrigine ok, some benefits. Ongoing anxiety now most problematic     Dx: bipolar 2 disorder; associate anxiety.     Treatment Goals:  Specify outcomes written in observable, behavioral terms:   Decrease mood lability, foster euthymia by self-report, questionnaires    Treatment  Plan/Recommendations:   · Lamotrigine 100 mg daily. hydroxyzine prn sleep. Start venlafaxine for anxiety. Ativan 1 mg daily prn panic attack. Discussed risks, benefits, & alternatives to treatment plan documented above with patient. I answered all patient questions related to this plan & patient expressed understanding & agreement. Stop lamotrigine in case of new rash. She's agreeable to this.   · F/u, 2 months.     Return to Clinic: 2 months    Counseling time: 5 minutes  Total time: 25 minutes    HIMA Guillen MD

## 2018-11-13 ENCOUNTER — OFFICE VISIT (OUTPATIENT)
Dept: INTERNAL MEDICINE | Facility: CLINIC | Age: 28
End: 2018-11-13
Payer: COMMERCIAL

## 2018-11-13 VITALS
SYSTOLIC BLOOD PRESSURE: 122 MMHG | HEART RATE: 91 BPM | OXYGEN SATURATION: 98 % | TEMPERATURE: 99 F | DIASTOLIC BLOOD PRESSURE: 80 MMHG | BODY MASS INDEX: 35.11 KG/M2 | HEIGHT: 65 IN | WEIGHT: 210.75 LBS

## 2018-11-13 DIAGNOSIS — K52.9 ACUTE GASTROENTERITIS: Primary | ICD-10-CM

## 2018-11-13 PROCEDURE — 99999 PR PBB SHADOW E&M-EST. PATIENT-LVL III: CPT | Mod: PBBFAC,,, | Performed by: FAMILY MEDICINE

## 2018-11-13 PROCEDURE — 99214 OFFICE O/P EST MOD 30 MIN: CPT | Mod: S$GLB,,, | Performed by: FAMILY MEDICINE

## 2018-11-13 PROCEDURE — 3008F BODY MASS INDEX DOCD: CPT | Mod: CPTII,S$GLB,, | Performed by: FAMILY MEDICINE

## 2018-11-13 RX ORDER — PROMETHAZINE HYDROCHLORIDE 25 MG/1
25 TABLET ORAL EVERY 6 HOURS PRN
Qty: 20 TABLET | Refills: 0 | Status: SHIPPED | OUTPATIENT
Start: 2018-11-13 | End: 2019-11-26

## 2018-11-13 NOTE — LETTER
November 13, 2018      [facility]  [address] [place] [address]  Phone: [phone]  Fax: [phone]       Patient: Alyssa Urena   YOB: 1990  Date of Visit: 11/13/2018    To Whom It May Concern:    Racheal Urena  was at [facility] on 11/13/2018. She may return to work on 11/15/2018 with no restrictions. Please also excuse form work on 11/12/2018. If you have any questions or concerns, or if I can be of further assistance, please do not hesitate to contact me.    Sincerely,    Jeff Stevens MD

## 2018-11-13 NOTE — PATIENT INSTRUCTIONS
Patient education on Gastroenteritis  BRAT diet. Increase fluid intake  Phenergan for nausea or vomiting  Immodium 2 initially, 1 each loose stool after that. Do not take more than 8 Imodium in 24 hour period. If Immodium no help, fill/try Lomotil prescription.  Should improve significantly in 48 to 72 hours, may take 7-10 days to return to normal  If worse despite medication, not better in 2 weeks, or if any blood noted in stool RTC

## 2018-11-13 NOTE — PROGRESS NOTES
Subjective:   Patient ID: Alyssa Urena is a 27 y.o. female.  Chief Complaint:  Headache; Nausea; and Emesis      Presents for evaluation of 5 days nausea / vomiting /diarrhea.    Symptoms started on Monday.  Low-grade fever.  Abdominal cramping and pain.  Reports unable to tolerate p.o. Foods, but tolerating liquids.    Blood pressure, heart rate, weight stable.    Yesterday still with 3-4 episodes of vomiting.  Diarrhea has significantly decreased in frequency, now only really with loose stools.  No suspicion of blood in stools.    No  symptoms.    Zofran at home only minimally effective for nausea.      GI Problem   The primary symptoms include fever, fatigue, abdominal pain, nausea, vomiting, diarrhea and myalgias. Primary symptoms do not include weight loss, melena, hematemesis, jaundice, hematochezia, dysuria, arthralgias or rash. The illness began 3 to 5 days ago. The problem has been gradually worsening.   The fever began 3 to 5 days ago. The fever has been resolved since its onset. The maximum temperature recorded prior to her arrival was unknown.   The fatigue began 3 to 5 days ago. The fatigue has been improving since its onset. The fatigue is worsened by nothing.   The abdominal pain began more than 2 days ago. The abdominal pain has been gradually improving since its onset. The abdominal pain is generalized. The abdominal pain does not radiate. The severity of the abdominal pain is 3/10. The abdominal pain is relieved by vomiting and bowel movements.   Nausea began 3 to 5 days ago. The nausea is exacerbated by food.   The vomiting began more than 2 days ago. Vomiting occurs 2 to 5 times per day. The emesis contains stomach contents.   The diarrhea began 3 to 5 days ago. The diarrhea is watery. The diarrhea occurs 2 to 4 times per day.   Myalgias began 3 to 5 days ago. The myalgias have been unchanged since their onset. The myalgias are present in the neck. The myalgias are aching. The  "myalgias are not associated with weakness.   The illness is also significant for anorexia. The illness does not include chills, dysphagia, odynophagia, bloating, constipation, tenesmus, back pain or itching.       Review of Systems   Constitutional: Positive for appetite change, fatigue and fever. Negative for activity change, chills, unexpected weight change and weight loss.   HENT: Negative for congestion, ear pain, hearing loss, postnasal drip, rhinorrhea, sinus pressure, sore throat and trouble swallowing.    Eyes: Negative for discharge and visual disturbance.   Respiratory: Negative for cough, chest tightness, shortness of breath and wheezing.    Cardiovascular: Negative for chest pain, palpitations and leg swelling.   Gastrointestinal: Positive for abdominal pain, anorexia, diarrhea, nausea and vomiting. Negative for bloating, blood in stool, constipation, dysphagia, hematemesis, hematochezia, jaundice and melena.   Endocrine: Negative for polydipsia and polyuria.   Genitourinary: Negative for difficulty urinating, dysuria, flank pain, frequency, hematuria and menstrual problem.   Musculoskeletal: Positive for myalgias and neck pain. Negative for arthralgias, back pain and joint swelling.   Skin: Negative for itching and rash.   Neurological: Negative for dizziness, syncope, weakness and light-headedness.   Psychiatric/Behavioral: Negative for confusion and dysphoric mood.     Objective:   /80 (BP Location: Right arm, Patient Position: Sitting, BP Method: Large (Manual))   Pulse 91   Temp 99.1 °F (37.3 °C) (Tympanic)   Ht 5' 5" (1.651 m)   Wt 95.6 kg (210 lb 12.2 oz)   LMP 10/23/2018 (Approximate)   SpO2 98%   BMI 35.07 kg/m²     Physical Exam   Constitutional: Vital signs are normal. She appears well-developed and well-nourished.  Non-toxic appearance. She does not have a sickly appearance. She does not appear ill. No distress.   HENT:   Mouth/Throat: Uvula is midline, oropharynx is clear and " moist and mucous membranes are normal.   Eyes: No scleral icterus.   Neck: Normal range of motion. Neck supple.   Cardiovascular: Normal rate, regular rhythm and normal heart sounds. Exam reveals no gallop and no friction rub.   No murmur heard.  Pulmonary/Chest: Effort normal and breath sounds normal.   Abdominal: Soft. Normal appearance and bowel sounds are normal. She exhibits no distension, no fluid wave, no ascites and no mass. There is no hepatosplenomegaly. There is generalized tenderness. There is no rigidity, no rebound, no guarding and no CVA tenderness. No hernia.   Lymphadenopathy:     She has no cervical adenopathy.        Right: No inguinal adenopathy present.        Left: No inguinal adenopathy present.   Skin: Skin is warm, dry and intact. No rash noted.   Psychiatric: She has a normal mood and affect. Her behavior is normal. Judgment normal. Cognition and memory are normal.     Assessment:     1. Acute gastroenteritis      Plan:   Acute gastroenteritis  -     promethazine (PHENERGAN) 25 MG tablet; Take 1 tablet (25 mg total) by mouth every 6 (six) hours as needed for Nausea.  Dispense: 20 tablet; Refill: 0    Patient education on Gastroenteritis  BRAT diet. Increase fluid intake  Phenergan for nausea or vomiting  Immodium 2 initially, 1 each loose stool after that. Do not take more than 8 Imodium in 24 hour period. If Immodium no help, fill/try Lomotil prescription.  Should improve significantly in 48 to 72 hours, may take 7-10 days to return to normal  If worse despite medication, not better in 2 weeks, or if any blood noted in stool RTC

## 2019-01-22 ENCOUNTER — OFFICE VISIT (OUTPATIENT)
Dept: PSYCHIATRY | Facility: CLINIC | Age: 29
End: 2019-01-22
Payer: COMMERCIAL

## 2019-01-22 VITALS
SYSTOLIC BLOOD PRESSURE: 111 MMHG | WEIGHT: 212.94 LBS | BODY MASS INDEX: 35.44 KG/M2 | HEART RATE: 95 BPM | DIASTOLIC BLOOD PRESSURE: 72 MMHG

## 2019-01-22 DIAGNOSIS — F31.81 BIPOLAR 2 DISORDER: Chronic | ICD-10-CM

## 2019-01-22 DIAGNOSIS — F31.32 BIPOLAR AFFECTIVE DISORDER, CURRENTLY DEPRESSED, MODERATE: Primary | ICD-10-CM

## 2019-01-22 DIAGNOSIS — F41.9 ANXIETY: ICD-10-CM

## 2019-01-22 PROCEDURE — 99999 PR PBB SHADOW E&M-EST. PATIENT-LVL II: CPT | Mod: PBBFAC,,, | Performed by: PSYCHIATRY & NEUROLOGY

## 2019-01-22 PROCEDURE — 99999 PR PBB SHADOW E&M-EST. PATIENT-LVL II: ICD-10-PCS | Mod: PBBFAC,,, | Performed by: PSYCHIATRY & NEUROLOGY

## 2019-01-22 PROCEDURE — 3008F BODY MASS INDEX DOCD: CPT | Mod: CPTII,S$GLB,, | Performed by: PSYCHIATRY & NEUROLOGY

## 2019-01-22 PROCEDURE — 3008F PR BODY MASS INDEX (BMI) DOCUMENTED: ICD-10-PCS | Mod: CPTII,S$GLB,, | Performed by: PSYCHIATRY & NEUROLOGY

## 2019-01-22 PROCEDURE — 99214 PR OFFICE/OUTPT VISIT, EST, LEVL IV, 30-39 MIN: ICD-10-PCS | Mod: S$GLB,,, | Performed by: PSYCHIATRY & NEUROLOGY

## 2019-01-22 PROCEDURE — 99214 OFFICE O/P EST MOD 30 MIN: CPT | Mod: S$GLB,,, | Performed by: PSYCHIATRY & NEUROLOGY

## 2019-01-22 RX ORDER — LORAZEPAM 1 MG/1
1 TABLET ORAL DAILY PRN
Qty: 15 TABLET | Refills: 0 | Status: SHIPPED | OUTPATIENT
Start: 2019-01-22 | End: 2019-02-21

## 2019-01-22 RX ORDER — LITHIUM CARBONATE 300 MG/1
CAPSULE ORAL
Qty: 90 CAPSULE | Refills: 2 | Status: SHIPPED | OUTPATIENT
Start: 2019-01-22 | End: 2019-11-26

## 2019-01-22 RX ORDER — VENLAFAXINE HYDROCHLORIDE 75 MG/1
CAPSULE, EXTENDED RELEASE ORAL
Qty: 60 CAPSULE | Refills: 2 | Status: SHIPPED | OUTPATIENT
Start: 2019-01-22 | End: 2019-11-26

## 2019-01-22 RX ORDER — LAMOTRIGINE 100 MG/1
TABLET ORAL
Qty: 30 TABLET | Refills: 2 | Status: SHIPPED | OUTPATIENT
Start: 2019-01-22 | End: 2019-11-26

## 2019-01-22 NOTE — PROGRESS NOTES
Outpatient Psychiatry Follow-up Visit (MD/NP)    1/22/2019    Alyssa Urena, a 28 y.o. female, presenting for follow-up visit. Met with patient.    Reason for Encounter: follow-up    IntervalHx: Patient seen & interviewed for follow-up, last seen about 2 months ago. More depressed and anxious than previously. Symptoms resemble those of previous episodes.   No new symptoms. Health generally ok. No new/different meds or stopped meds. Adherent to medications. Wedding still on for August.      Background: 27 y/o F referred by PA for mood lability. First presented to [facility] in February: 3 week complaint of stress and anxiety at work... states that her job they're understaffed and she feels overworked most of the time... anxiety is intermittent moderate to severe intensity feeling of being overwhelmed. She states sometimes she has to just go to a room by herself... associated trouble sleeping stating that she wakes up many times during the night wondering about job duties. She says she is also getting an abbreviated amount of sleep since she usually has been about 10 or 11 PM and wakes up almost every morning at 3 AM. Was started on venlafaxine. She requested & received an increased dose in March. She had an evaluation for unexplained CP and dizziness in June (workup was unrevealing). Re-presented in July for ongoing mood symptoms. From BRIDGETTE Cruz's note (highlights mine): 27 y/o female c/o mood swings X couple weeks. Pt does not have a PCP. She reports intermittent mood swings from feeling happy to feeling very sad & crying. She reports she doesn't know why they are happening & reports no trauma or recent changes in her life. No new stressors. She denies any SI/HI. She reports she lives with her parents & they are supportive. She reports bipolar disorder runs in her family and she is concerned she may have this. She is taking Effexor daily. She also had chest pain with associated spinning dizziness about  "1 month ago & was seen by a different provider at that time. EKG & CXR were normal & TSH was slightly elevated - FT4 normal. She reports early CAD family history & has not seen a cardiologist. She reports chest pain (which occurred randomly) seems to have resolved & she denies any current CP. She has taken Antivert for her dizziness with relief. She reports no palpitations, fever, chills, cough, edema, exertional sxs, abd pain, chance of pregnancy, N/V, diaphoresis, sxs of infection, or other medical complaints. She reports she does not smoke. Confirms this hx today, reports "my mood swings are crazy". "I go from super happy to super depressed  I don't know why." Experiences "dark thoughts" including suicidal thoughts & fantasies, but no plans or intent. Denies new stressors identified to trigger moods. Denies episodes prior to recent months. Moods initially swung between euthymic, sad without provocation for weeks but then between euthymic, anxious & sad, & most recently have began to be mostly irritable with tinge of anxiety, featuring frequent racing thoughts whitout pressured speech. Drinks daily - "it calms me". More irritable without it, relaxed with it. No withdrawal symptoms on discontinuation. No tolerance. Bottle of wine daily x 1 year. Rare beer or liquor. Wasn't originally to bring self relief. Had one hangover. No morning alcohol. None during workday. No illicits. No prescription misuse. PsychHx: as above; denies otherwise. MedHx: Heart starts racing, short of breath, crying spells. Triggered by stress. Doesn't happen at home. Never happens for no reason at all. Meds: effexor since February or March - sebas better with anxiety. "not a complete breakdown". antivert - workup with PCP. FamHx: GM - ; no substance abuse problems. SocHx: reviewed. bipolar/scz, aunt bipolar disorder. Works as medical assistant for orthopedic surgeons, "takes a lot of the messages".    Review Of Systems:     GENERAL:  +weight " gain  SKIN:  No rashes or lacerations  HEAD:  No headaches  CHEST:  No shortness of breath, hyperventilation or cough  CARDIOVASCULAR:  No tachycardia or chest pain  ABDOMEN:  No nausea, vomiting, pain, constipation or diarrhea  URINARY:  No frequency, dysuria or sexual dysfunction  MUSCULOSKELETAL:  No pain or stiffness of the joints  NEUROLOGIC:  No weakness, sensory changes, seizures, confusion, memory loss, tremor or other abnormal movements    Current Evaluation:     Nutritional Screening: Considering the patient's height and weight, medications, medical history and preferences, should a referral be made to the dietitian? no    Constitutional  Vitals:  Most recent vital signs, dated less than 90 days prior to this appointment, were not reviewed.    There were no vitals filed for this visit.     General:  unremarkable, age appropriate     Musculoskeletal  Muscle Strength/Tone:  no dystonia, no tremor   Gait & Station:  non-ataxic     Psychiatric  Appearance: casually dressed & groomed;   Behavior: calm,   Cooperation: cooperative with assessment  Speech: normal rate, volume, tone  Thought Process: linear, goal-directed  Thought Content: No suicidal or homicidal ideation; no delusions  Affect: anxious  Mood: anxious  Perceptions: No auditory or visual hallucinations  Level of Consciousness: alert throughout interview  Insight: fair  Cognition: Oriented to person, place, time, & situation  Memory: no apparent deficits to general clinical interview; not formally assessed  Attention/Concentration: no apparent deficits to general clinical interview; not formally assessed  Fund of Knowledge: average by vocabulary/education    Laboratory Data  No visits with results within 1 Month(s) from this visit.   Latest known visit with results is:   Lab Visit on 07/06/2018   Component Date Value Ref Range Status    Sodium 07/06/2018 139  136 - 145 mmol/L Final    Potassium 07/06/2018 3.6  3.5 - 5.1 mmol/L Final    Chloride  07/06/2018 105  95 - 110 mmol/L Final    CO2 07/06/2018 24  23 - 29 mmol/L Final    Glucose 07/06/2018 89  70 - 110 mg/dL Final    BUN, Bld 07/06/2018 5* 6 - 20 mg/dL Final    Creatinine 07/06/2018 0.9  0.5 - 1.4 mg/dL Final    Calcium 07/06/2018 9.5  8.7 - 10.5 mg/dL Final    Total Protein 07/06/2018 8.2  6.0 - 8.4 g/dL Final    Albumin 07/06/2018 4.0  3.5 - 5.2 g/dL Final    Total Bilirubin 07/06/2018 0.5  0.1 - 1.0 mg/dL Final    Alkaline Phosphatase 07/06/2018 48* 55 - 135 U/L Final    AST 07/06/2018 13  10 - 40 U/L Final    ALT 07/06/2018 17  10 - 44 U/L Final    Anion Gap 07/06/2018 10  8 - 16 mmol/L Final    eGFR if African American 07/06/2018 >60  >60 mL/min/1.73 m^2 Final    eGFR if non African American 07/06/2018 >60  >60 mL/min/1.73 m^2 Final    WBC 07/06/2018 4.03  3.90 - 12.70 K/uL Final    RBC 07/06/2018 4.19  4.00 - 5.40 M/uL Final    Hemoglobin 07/06/2018 12.7  12.0 - 16.0 g/dL Final    Hematocrit 07/06/2018 38.6  37.0 - 48.5 % Final    MCV 07/06/2018 92  82 - 98 fL Final    MCH 07/06/2018 30.3  27.0 - 31.0 pg Final    MCHC 07/06/2018 32.9  32.0 - 36.0 g/dL Final    RDW 07/06/2018 12.4  11.5 - 14.5 % Final    Platelets 07/06/2018 215  150 - 350 K/uL Final    MPV 07/06/2018 10.9  9.2 - 12.9 fL Final    Gran # (ANC) 07/06/2018 2.1  1.8 - 7.7 K/uL Final    Lymph # 07/06/2018 1.4  1.0 - 4.8 K/uL Final    Mono # 07/06/2018 0.4  0.3 - 1.0 K/uL Final    Eos # 07/06/2018 0.1  0.0 - 0.5 K/uL Final    Baso # 07/06/2018 0.02  0.00 - 0.20 K/uL Final    Gran% 07/06/2018 51.1  38.0 - 73.0 % Final    Lymph% 07/06/2018 34.7  18.0 - 48.0 % Final    Mono% 07/06/2018 10.7  4.0 - 15.0 % Final    Eosinophil% 07/06/2018 3.0  0.0 - 8.0 % Final    Basophil% 07/06/2018 0.5  0.0 - 1.9 % Final    Differential Method 07/06/2018 Automated   Final    Amylase 07/06/2018 96  20 - 110 U/L Final    Lipase 07/06/2018 43  4 - 60 U/L Final    Hepatitis B Surface Ag 07/06/2018 Negative   Final     Hep B C IgM 07/06/2018 Negative   Final    Hep A IgM 07/06/2018 Negative   Final    Hepatitis C Ab 07/06/2018 Negative   Final     Medications  Outpatient Encounter Medications as of 1/22/2019   Medication Sig Dispense Refill    acyclovir (ZOVIRAX) 400 MG tablet Take 1 tablet (400 mg total) by mouth 3 (three) times daily with meals. for 10 days 30 tablet 0    lamoTRIgine (LAMICTAL) 100 MG tablet Take 1 tablet by mouth daily 30 tablet 2    LORazepam (ATIVAN) 1 MG tablet Take 1 tablet (1 mg total) by mouth daily as needed (panic attack). 15 tablet 0    prenatal no122/iron/folic acid (PRENATAL MULTI ORAL) Take 1 tablet by mouth once daily.      promethazine (PHENERGAN) 25 MG tablet Take 1 tablet (25 mg total) by mouth every 6 (six) hours as needed for Nausea. 20 tablet 0    traZODone (DESYREL) 100 MG tablet Take 1/2 to 1 tablet at bedtime as needed for sleep. 30 tablet 2    venlafaxine (EFFEXOR-XR) 75 MG 24 hr capsule Take 1 daily for 7 days then 2 daily thereafter 60 capsule 2     No facility-administered encounter medications on file as of 1/22/2019.      Assessment - Diagnosis - Goals:     Impression: intermittent mood episodes starting with anxiety but later featuring prolonged considerably lability, low moods, suicidal thoughts, irritability. Benefited from quetiapine but experienced considerable weight gain unresponsive to behavioral changes. Had several unprovoked panic attacks. Weight gain reduced on lurasidone but still present and therapeutic benefits were ok. Tolerating lamotrigine ok, minimal benefits. Ongoing anxiety now most problematic     Dx: bipolar 2 disorder; associate anxiety.     Treatment Goals:  Specify outcomes written in observable, behavioral terms:   Decrease mood lability, foster euthymia by self-report, questionnaires    Treatment Plan/Recommendations:   · Lithium + Lamotrigine for bipolar diosrder. hydroxyzine prn sleep. venlafaxine for anxiety. Ativan 1 mg daily prn panic attack.  Discussed risks, benefits, & alternatives to treatment plan documented above with patient. I answered all patient questions related to this plan & patient expressed understanding & agreement. Stop lamotrigine in case of new rash. She's agreeable to this.   · F/u, 2 months.     Return to Clinic: 2 months    Counseling time: 10 minutes  Total time: 25 minutes    HIMA Guillen MD

## 2019-02-11 ENCOUNTER — PATIENT MESSAGE (OUTPATIENT)
Dept: PSYCHIATRY | Facility: CLINIC | Age: 29
End: 2019-02-11

## 2019-03-12 ENCOUNTER — OFFICE VISIT (OUTPATIENT)
Dept: INTERNAL MEDICINE | Facility: CLINIC | Age: 29
End: 2019-03-12
Payer: COMMERCIAL

## 2019-03-12 VITALS
SYSTOLIC BLOOD PRESSURE: 116 MMHG | TEMPERATURE: 99 F | DIASTOLIC BLOOD PRESSURE: 90 MMHG | HEART RATE: 103 BPM | WEIGHT: 214.5 LBS | HEIGHT: 65 IN | OXYGEN SATURATION: 99 % | BODY MASS INDEX: 35.74 KG/M2

## 2019-03-12 DIAGNOSIS — J06.9 VIRAL URI WITH COUGH: Primary | ICD-10-CM

## 2019-03-12 DIAGNOSIS — F31.81 BIPOLAR 2 DISORDER: Chronic | ICD-10-CM

## 2019-03-12 PROCEDURE — 99999 PR PBB SHADOW E&M-EST. PATIENT-LVL IV: CPT | Mod: PBBFAC,,, | Performed by: FAMILY MEDICINE

## 2019-03-12 PROCEDURE — 96372 THER/PROPH/DIAG INJ SC/IM: CPT | Mod: S$GLB,,, | Performed by: FAMILY MEDICINE

## 2019-03-12 PROCEDURE — 99999 PR PBB SHADOW E&M-EST. PATIENT-LVL IV: ICD-10-PCS | Mod: PBBFAC,,, | Performed by: FAMILY MEDICINE

## 2019-03-12 PROCEDURE — 96372 PR INJECTION,THERAP/PROPH/DIAG2ST, IM OR SUBCUT: ICD-10-PCS | Mod: S$GLB,,, | Performed by: FAMILY MEDICINE

## 2019-03-12 PROCEDURE — 3008F PR BODY MASS INDEX (BMI) DOCUMENTED: ICD-10-PCS | Mod: CPTII,S$GLB,, | Performed by: FAMILY MEDICINE

## 2019-03-12 PROCEDURE — 99214 PR OFFICE/OUTPT VISIT, EST, LEVL IV, 30-39 MIN: ICD-10-PCS | Mod: 25,S$GLB,, | Performed by: FAMILY MEDICINE

## 2019-03-12 PROCEDURE — 3008F BODY MASS INDEX DOCD: CPT | Mod: CPTII,S$GLB,, | Performed by: FAMILY MEDICINE

## 2019-03-12 PROCEDURE — 99214 OFFICE O/P EST MOD 30 MIN: CPT | Mod: 25,S$GLB,, | Performed by: FAMILY MEDICINE

## 2019-03-12 RX ORDER — OXYMETAZOLINE HCL 0.05 %
SPRAY, NON-AEROSOL (ML) NASAL
Qty: 15 ML | Refills: 0 | Status: SHIPPED | OUTPATIENT
Start: 2019-03-12 | End: 2019-11-26

## 2019-03-12 RX ORDER — BETAMETHASONE SODIUM PHOSPHATE AND BETAMETHASONE ACETATE 3; 3 MG/ML; MG/ML
6 INJECTION, SUSPENSION INTRA-ARTICULAR; INTRALESIONAL; INTRAMUSCULAR; SOFT TISSUE
Status: COMPLETED | OUTPATIENT
Start: 2019-03-12 | End: 2019-03-12

## 2019-03-12 RX ORDER — FLUTICASONE PROPIONATE 50 MCG
2 SPRAY, SUSPENSION (ML) NASAL DAILY
Qty: 1 BOTTLE | Refills: 0 | Status: SHIPPED | OUTPATIENT
Start: 2019-03-12 | End: 2019-03-26

## 2019-03-12 RX ORDER — BENZONATATE 200 MG/1
200 CAPSULE ORAL 3 TIMES DAILY PRN
Qty: 30 CAPSULE | Refills: 1 | Status: SHIPPED | OUTPATIENT
Start: 2019-03-12 | End: 2019-03-22

## 2019-03-12 RX ADMIN — BETAMETHASONE SODIUM PHOSPHATE AND BETAMETHASONE ACETATE 6 MG: 3; 3 INJECTION, SUSPENSION INTRA-ARTICULAR; INTRALESIONAL; INTRAMUSCULAR; SOFT TISSUE at 12:03

## 2019-03-12 NOTE — ASSESSMENT & PLAN NOTE
Appears stable. Prior use of corticosteroids have NOT caused hypomania or other exacerbation of bipolar disorder.

## 2019-03-12 NOTE — PATIENT INSTRUCTIONS
2019        TO WHOM IT MAY CONCERN:     RE:  Alyssa Ross Ayanna ,  1990     Alyssa was treated in my office 3/12/19 for an acute illness.    She is estimated to be able to return to work without restrictions in 1-3 days.    Please extend to Alyssa all due courtesy and consideration and excuse her absence.    Sincerely,     CHRISTOPHER Boogie MD

## 2019-03-18 ENCOUNTER — PATIENT MESSAGE (OUTPATIENT)
Dept: OBSTETRICS AND GYNECOLOGY | Facility: CLINIC | Age: 29
End: 2019-03-18

## 2019-03-26 ENCOUNTER — LAB VISIT (OUTPATIENT)
Dept: LAB | Facility: HOSPITAL | Age: 29
End: 2019-03-26
Attending: OBSTETRICS & GYNECOLOGY
Payer: COMMERCIAL

## 2019-03-26 ENCOUNTER — OFFICE VISIT (OUTPATIENT)
Dept: OBSTETRICS AND GYNECOLOGY | Facility: CLINIC | Age: 29
End: 2019-03-26
Payer: COMMERCIAL

## 2019-03-26 VITALS
DIASTOLIC BLOOD PRESSURE: 78 MMHG | BODY MASS INDEX: 36.66 KG/M2 | HEIGHT: 64 IN | WEIGHT: 214.75 LBS | SYSTOLIC BLOOD PRESSURE: 128 MMHG

## 2019-03-26 DIAGNOSIS — Z11.3 SCREEN FOR STD (SEXUALLY TRANSMITTED DISEASE): ICD-10-CM

## 2019-03-26 DIAGNOSIS — Z12.4 PAP SMEAR FOR CERVICAL CANCER SCREENING: Primary | ICD-10-CM

## 2019-03-26 PROCEDURE — 86703 HIV-1/HIV-2 1 RESULT ANTBDY: CPT

## 2019-03-26 PROCEDURE — 99999 PR PBB SHADOW E&M-EST. PATIENT-LVL III: CPT | Mod: PBBFAC,,, | Performed by: OBSTETRICS & GYNECOLOGY

## 2019-03-26 PROCEDURE — 99395 PR PREVENTIVE VISIT,EST,18-39: ICD-10-PCS | Mod: S$GLB,,, | Performed by: OBSTETRICS & GYNECOLOGY

## 2019-03-26 PROCEDURE — 87491 CHLMYD TRACH DNA AMP PROBE: CPT

## 2019-03-26 PROCEDURE — 88141 CYTOPATH C/V INTERPRET: CPT | Mod: ,,, | Performed by: PATHOLOGY

## 2019-03-26 PROCEDURE — 87624 HPV HI-RISK TYP POOLED RSLT: CPT

## 2019-03-26 PROCEDURE — 99999 PR PBB SHADOW E&M-EST. PATIENT-LVL III: ICD-10-PCS | Mod: PBBFAC,,, | Performed by: OBSTETRICS & GYNECOLOGY

## 2019-03-26 PROCEDURE — 88141 LIQUID-BASED PAP SMEAR, SCREENING: ICD-10-PCS | Mod: ,,, | Performed by: PATHOLOGY

## 2019-03-26 PROCEDURE — 36415 COLL VENOUS BLD VENIPUNCTURE: CPT

## 2019-03-26 PROCEDURE — 86592 SYPHILIS TEST NON-TREP QUAL: CPT

## 2019-03-26 PROCEDURE — 88175 CYTOPATH C/V AUTO FLUID REDO: CPT | Performed by: PATHOLOGY

## 2019-03-26 PROCEDURE — 99395 PREV VISIT EST AGE 18-39: CPT | Mod: S$GLB,,, | Performed by: OBSTETRICS & GYNECOLOGY

## 2019-03-26 NOTE — PROGRESS NOTES
Subjective:       Patient ID: Alyssa Urena is a 28 y.o. female.    Chief Complaint:  Annual Exam      History of Present Illness  HPI  Annual Exam-Premenopausal  Patient presents for annual exam. The patient has no complaints today. The patient is sexually active. GYN screening history: last pap: approximate date 2017 and was abnormal: ASC-H (pt did not come in for colposcopy and was lost to follow up). The patient wears seatbelts: yes. The patient participates in regular exercise: no. Has the patient ever been transfused or tattooed?: yes. The patient reports that there is not domestic violence in her life.  Menses are regular.  Denies excessive bleeding or cramping.  Reports that she may have passed a miscarriage late last year but was not seen by MD for this (had home UPT followed by a heavy bleed).  Requests STD screening.  Denies known exposure.        GYN & OB History  Patient's last menstrual period was 2019 (exact date).   Date of Last Pap: 2017    OB History    Para Term  AB Living   3 1 1   2 1   SAB TAB Ectopic Multiple Live Births   1   1   1      # Outcome Date GA Lbr Evaristo/2nd Weight Sex Delivery Anes PTL Lv   3 SAB 2018           2 Ectopic 2013           1 Term 2009    F Vag-Spont   ALESHIA       Review of Systems  Review of Systems   Constitutional: Negative for activity change, appetite change, fatigue, fever and unexpected weight change.   Respiratory: Negative for shortness of breath.    Cardiovascular: Negative for chest pain, palpitations and leg swelling.   Gastrointestinal: Positive for abdominal pain. Negative for bloating, blood in stool, constipation, diarrhea, nausea and vomiting.   Genitourinary: Negative for dysmenorrhea, dyspareunia, dysuria, flank pain, frequency, genital sores, hematuria, hot flashes, menorrhagia, menstrual problem, pelvic pain, urgency, vaginal bleeding, vaginal discharge, vaginal pain, urinary incontinence, vaginal dryness and  vaginal odor.   Musculoskeletal: Negative for back pain.   Integumentary:  Negative for breast mass, nipple discharge, breast skin changes and breast tenderness.   Neurological: Positive for headaches. Negative for syncope.   Breast: Negative for asymmetry, lump, mass, mastodynia, nipple discharge, skin changes and tenderness          Objective:    Physical Exam:   Constitutional: She is oriented to person, place, and time. She appears well-developed and well-nourished. No distress.    HENT:   Head: Normocephalic and atraumatic.    Eyes: Pupils are equal, round, and reactive to light. EOM are normal.    Neck: Normal range of motion.    Cardiovascular: Normal rate, regular rhythm and normal heart sounds.     Pulmonary/Chest: Effort normal and breath sounds normal. Right breast exhibits no inverted nipple, no mass, no nipple discharge, no skin change, no tenderness, no bleeding and no swelling. Left breast exhibits no inverted nipple, no mass, no nipple discharge, no skin change, no tenderness, no bleeding and no swelling. Breasts are symmetrical.        Abdominal: Soft. Bowel sounds are normal. She exhibits no distension. There is no tenderness.     Genitourinary: Vagina normal and uterus normal. Pelvic exam was performed with patient supine. There is no rash, tenderness, lesion or injury on the right labia. There is no rash, tenderness, lesion or injury on the left labia. Uterus is not deviated, not enlarged and not tender. Cervix is normal. Right adnexum displays no mass, no tenderness and no fullness. Left adnexum displays no mass, no tenderness and no fullness. No erythema, tenderness or bleeding in the vagina. No foreign body in the vagina. No signs of injury around the vagina. No vaginal discharge found. Cervix exhibits no motion tenderness, no discharge and no friability. Additional cervical findings: pap smear done          Musculoskeletal: Normal range of motion and moves all extremeties. She exhibits no  edema or tenderness.       Neurological: She is alert and oriented to person, place, and time.    Skin: Skin is warm and dry.    Psychiatric: She has a normal mood and affect. Her behavior is normal. Thought content normal.          Assessment:        1. Pap smear for cervical cancer screening    2. Screen for STD (sexually transmitted disease)             Plan:      Pap smear for cervical cancer screening  -     Liquid-based pap smear, screening  -     Pt was counseled on cervical/vaginal screening guidelines and recommendations.  Last pap ASC-H on 09/2017 (pt has been non-compliant with follow up).  Pt was counseled on importance of follow up and on risks of non-compliance.  If today's pap smear result is negative, next pap smear will be due in 6 months.  -     Pt was advised on current breast cancer screening recommendations.  Pt desires to proceed with breast exam today.  -     Follow up with PCP for routine health maintenance needs.  -     Pt was counseled on contraception options.  Is not interested in any at this time.    Screen for STD (sexually transmitted disease)  -     C. trachomatis/N. gonorrhoeae by AMP DNA  -     HIV 1/2 Ag/Ab (4th Gen); Future; Expected date: 03/26/2019  -     RPR; Future; Expected date: 03/26/2019      Follow up in about 6 months (around 9/26/2019) for Pap.

## 2019-03-27 LAB
HIV 1+2 AB+HIV1 P24 AG SERPL QL IA: NEGATIVE
RPR SER QL: NORMAL

## 2019-03-28 LAB
C TRACH DNA SPEC QL NAA+PROBE: NOT DETECTED
N GONORRHOEA DNA SPEC QL NAA+PROBE: NOT DETECTED

## 2019-03-30 NOTE — PROGRESS NOTES
CHIEF COMPLAINT  URI      ENCOUNTER DIAGNOSES  1. Viral URI with cough    2. Bipolar 2 disorder        HISTORY, ASSESSMENT, and PLAN    New problem is upper respiratory infection symptoms.  Quality described as nasal congestion and coryza.  Severity described as moderately severe at worst, moderate at present.  Onset reported as several days ago.  Timing described as typically worse at night.  Associated symptoms include cough productive of modest amount of sputum.  I educated her on the apparently viral nature of this acute illness, how the management was largely supportive and symptom focused. We discussed the risks and benefits of treatment options, and she feels that the severity of her symptoms is sufficient to warrant the treatment prescribed. I discouraged the use of over-the-counter cough and cold medications. I told her to expect gradual improvement and resolution of symptoms over the next week or two, and I instructed her to let me know if her illness failed to follow this expected course.     Problem List Items Addressed This Visit        Psychiatric    Bipolar 2 disorder (Chronic)    Current Assessment & Plan     Appears stable. Prior use of corticosteroids have NOT caused hypomania or other exacerbation of bipolar disorder.           Other Visit Diagnoses     Viral URI with cough    -  Primary    Relevant Medications    oxymetazoline (AFRIN, OXYMETAZOLINE,) 0.05 % nasal spray    betamethasone acetate-betamethasone sodium phosphate injection 6 mg (Completed)          Orders Placed This Encounter    fluticasone (FLONASE) 50 mcg/actuation nasal spray    oxymetazoline (AFRIN, OXYMETAZOLINE,) 0.05 % nasal spray    benzonatate (TESSALON) 200 MG capsule    betamethasone acetate-betamethasone sodium phosphate injection 6 mg       Medications Ordered This Encounter   Medications    benzonatate (TESSALON) 200 MG capsule     Sig: Take 1 capsule (200 mg total) by mouth 3 (three) times daily as needed for  "Cough.     Dispense:  30 capsule     Refill:  1    betamethasone acetate-betamethasone sodium phosphate injection 6 mg    fluticasone (FLONASE) 50 mcg/actuation nasal spray     Si sprays (100 mcg total) by Each Nare route once daily. for 14 days     Dispense:  1 Bottle     Refill:  0    oxymetazoline (AFRIN, OXYMETAZOLINE,) 0.05 % nasal spray     Sig: Use 2 sprays in each nostril 1 time daily each evening as needed for congestion. DO NOT USE FOR MORE THAN 4 DAYS.     Dispense:  15 mL     Refill:  0     If insurance does not cover, please offer her a quality generic OTC equivalent. Either way, DISPENSE WITH LABELING AS PRESCRIBED. Thanks.        There are no discontinued medications.   COMMENT: Unless ndicated otherwise, current treatments (including prescription medications) are to be continued.    REVIEW OF SYSTEMS  CONSTITUTIONAL: No objective fever reported.  PULMONARY: No hemoptysis or difficulty breathing reported.  CARDIOVASCULAR: No chest pain or orthopnea reported.       PHYSICAL EXAM  Vitals:    19 1054   BP: (!) 116/90   BP Location: Right arm   Patient Position: Sitting   BP Method: Large (Manual)   Pulse: 103   Temp: 98.6 °F (37 °C)   TempSrc: Tympanic   SpO2: 99%   Weight: 97.3 kg (214 lb 8.1 oz)   Height: 5' 4.5" (1.638 m)     CONSTITUTIONAL: Vital signs noted. No apparent distress. Does not appear acutely ill or septic. Appears adequately hydrated.  EYES: Pupils equal and reactive. Extraocular movements intact. Sclerae anicteric. Lids and conjunctiva unremarkable.  ENT: External ENT grossly unremarkable. Ear canals and visualized tympanic membranes are unremarkable. Hearing grossly intact. Nasal mucosa pink. Oropharynx moist without lesion, inflammation or exudate. Posterior oropharynx is symmetric.  NECK: Trachea midline. No significant cervical lymphadenopathy.  PULM: Lungs clear. Breathing unlabored.  HEART: Auscultation reveals regular rate and rhythm.  DERM: Skin warm and moist with " "normal turgor.     Follow-up if symptoms worsen or fail to improve.    Documentation entered by me for this encounter may have been done in part using speech-recognition technology. Although I have made an effort to ensure accuracy, "sound like" errors may exist and should be interpreted in context. -CHRISTOPHER Boogie MD   "

## 2019-04-04 ENCOUNTER — PATIENT MESSAGE (OUTPATIENT)
Dept: OBSTETRICS AND GYNECOLOGY | Facility: CLINIC | Age: 29
End: 2019-04-04

## 2019-04-12 ENCOUNTER — TELEPHONE (OUTPATIENT)
Dept: OBSTETRICS AND GYNECOLOGY | Facility: CLINIC | Age: 29
End: 2019-04-12

## 2019-04-12 NOTE — TELEPHONE ENCOUNTER
Patient informed that her pap showed ASCUS glandular cells and will need to have a colposcopy with [facility] scheduled.  Patient voiced understanding and she scheduled for 4pm at [facility] location 04/29.

## 2019-04-12 NOTE — TELEPHONE ENCOUNTER
----- Message from Tong De La Garza MD sent at 4/12/2019  1:58 PM CDT -----  ASCUS with Atypical Glandular Cells.  Will have nurse contact pt to schedule Colposcopy with [facility].

## 2019-04-16 LAB
HPV HR 12 DNA CVX QL NAA+PROBE: POSITIVE
HPV16 AG SPEC QL: NEGATIVE
HPV18 DNA SPEC QL NAA+PROBE: NEGATIVE

## 2019-04-30 ENCOUNTER — PATIENT MESSAGE (OUTPATIENT)
Dept: OBSTETRICS AND GYNECOLOGY | Facility: CLINIC | Age: 29
End: 2019-04-30

## 2019-04-30 ENCOUNTER — TELEPHONE (OUTPATIENT)
Dept: OBSTETRICS AND GYNECOLOGY | Facility: CLINIC | Age: 29
End: 2019-04-30

## 2019-04-30 NOTE — TELEPHONE ENCOUNTER
----- Message from Henny Jones sent at 4/30/2019  8:03 AM CDT -----  Contact: [phone]  Would like to consult with the nurse, patients states that's she was supposed to have a procedure done and have not heard anything , patient would like to speak with the nurse concerning this, please [phone], [phone], thank sj

## 2019-05-07 ENCOUNTER — PATIENT MESSAGE (OUTPATIENT)
Dept: OBSTETRICS AND GYNECOLOGY | Facility: CLINIC | Age: 29
End: 2019-05-07

## 2019-05-07 RX ORDER — ACYCLOVIR 400 MG/1
400 TABLET ORAL
Qty: 15 TABLET | Refills: 5 | Status: SHIPPED | OUTPATIENT
Start: 2019-05-07 | End: 2020-04-30

## 2019-05-07 NOTE — TELEPHONE ENCOUNTER
Acyclovir refill sent as requested   This is a recent snapshot of the patient's [facility] medical record.  For current drug dose and complete information and questions, call [phone] or In Basket pool, [facility] ([id number])  CSN Number:  [id number]

## 2019-05-07 NOTE — TELEPHONE ENCOUNTER
Pt is requesting refill for ACYCLOVIR. Pt last annual 03/26/2019. Pharmacy up to date. Please advise.

## 2019-05-10 ENCOUNTER — PROCEDURE VISIT (OUTPATIENT)
Dept: OBSTETRICS AND GYNECOLOGY | Facility: CLINIC | Age: 29
End: 2019-05-10
Payer: COMMERCIAL

## 2019-05-10 VITALS
HEIGHT: 64 IN | WEIGHT: 218.69 LBS | SYSTOLIC BLOOD PRESSURE: 110 MMHG | DIASTOLIC BLOOD PRESSURE: 76 MMHG | BODY MASS INDEX: 37.34 KG/M2

## 2019-05-10 DIAGNOSIS — R87.619 ATYPICAL GLANDULAR CELLS ON CERVICAL PAP SMEAR: ICD-10-CM

## 2019-05-10 DIAGNOSIS — R87.810 ASCUS WITH POSITIVE HIGH RISK HPV CERVICAL: Primary | ICD-10-CM

## 2019-05-10 DIAGNOSIS — R87.610 ASCUS WITH POSITIVE HIGH RISK HPV CERVICAL: Primary | ICD-10-CM

## 2019-05-10 PROCEDURE — 88342 IMHCHEM/IMCYTCHM 1ST ANTB: CPT | Performed by: PATHOLOGY

## 2019-05-10 PROCEDURE — 88305 TISSUE EXAM BY PATHOLOGIST: CPT | Performed by: PATHOLOGY

## 2019-05-10 PROCEDURE — 81025 URINE PREGNANCY TEST: CPT | Mod: S$GLB,,, | Performed by: OBSTETRICS & GYNECOLOGY

## 2019-05-10 PROCEDURE — 88305 TISSUE EXAM BY PATHOLOGIST: CPT | Mod: 26,,, | Performed by: PATHOLOGY

## 2019-05-10 PROCEDURE — 57454 BX/CURETT OF CERVIX W/SCOPE: CPT | Mod: S$GLB,,, | Performed by: OBSTETRICS & GYNECOLOGY

## 2019-05-10 PROCEDURE — 88305 TISSUE SPECIMEN TO PATHOLOGY, OBSTETRICS/GYNECOLOGY: ICD-10-PCS | Mod: 26,,, | Performed by: PATHOLOGY

## 2019-05-10 PROCEDURE — 81025 PR  URINE PREGNANCY TEST: ICD-10-PCS | Mod: S$GLB,,, | Performed by: OBSTETRICS & GYNECOLOGY

## 2019-05-10 PROCEDURE — 88342 TISSUE SPECIMEN TO PATHOLOGY, OBSTETRICS/GYNECOLOGY: ICD-10-PCS | Mod: 26,,, | Performed by: PATHOLOGY

## 2019-05-10 PROCEDURE — 57454 COLPOSCOPY-TODAY: ICD-10-PCS | Mod: S$GLB,,, | Performed by: OBSTETRICS & GYNECOLOGY

## 2019-05-10 PROCEDURE — 88342 IMHCHEM/IMCYTCHM 1ST ANTB: CPT | Mod: 26,,, | Performed by: PATHOLOGY

## 2019-05-10 NOTE — PROCEDURES
Colposcopy-Today  Date/Time: 5/10/2019 11:48 AM  Performed by: Tong De La Garza MD  Authorized by: Tong De La Garza MD     Consent Done?:  Yes (Written)  Assistants?: No      Colposcopy Site:  Cervix  Position:  Supine  Acrowhite Lesion? Yes    Atypical Vessels: No    Transformation Zone Adequate?: Yes    Biopsy?: Yes         Location:  Cervix ((2 00))  ECC Performed?: Yes    LEEP Performed?: No    Estimated blood loss (cc):  1   Patient tolerated the procedure well with no immediate complications.   Post-operative instructions were provided for the patient.   Patient was discharged and will follow up if any complications occur     COLPOSCOPY:    Alyssa Urena is a 28 y.o. female   presents for colposcopy.  Patient's last menstrual period was 2019..  Her most recent pap smear shows ASCUS with POSITIVE high risk HPV.      The abnormal test findings were discussed, as well as HPV infection, need for colposcopy and possible biopsies to determine the plan of care, treatments available, the minimal risk of bleeding and infection with colposcopy, and alternatives to colposcopy.  Pt voiced understanding and desires to proceed.      UPT is negative    COLPOSCOPY EXAM:   TIME OUT PERFORMED.     No gross vulvovaginal or cervix lesions noted.  On colpo: no mosaicism, no punctation, no abnormal vasculature and acetowhite lesion(s) noted at 2 o'clock    Biopsy was taken at 2 o'clock.  ECC was performed.    SCJ was entirely visualized.    The cervix was visualized with a speculum.  No additional instrumentation was required.  A sterile endometrial pipelle was passed without difficulty to a depth of 8 cm.  Adequate amount of tissue was obtained on a single pass.  Hemostasis was adequate with application of Monsel's solution.  The speculum was removed.  The patient did tolerate the procedure well.    All collected specimens sent to pathology for histologic analysis.    Post-colposcopy counseling:  The  patient was instructed to manage post-colposcopy cramping with NSAIDs or Tylenol, or with a prescription per the medication card.  Avoid intercourse, douching, or tampons in the vagina for at least 2-3 days.  Expect a clumpy blackish discharge due to Monsel's solution application for several days.  Report heavy bleeding, worsening pain or pain that does not respond to above medications, or foul-smelling vaginal discharge. HPV vaccine recommended according to FDA age guidelines.  Importance of follow-up stressed.      Follow up based on colposcopy results.  Impression:  JERRELL 1.  Will await pathology results for further recommendations.  Pt was advised on possible need for excisional procedure (LEEP vs CKC) based on results.  Pt voiced understanding.

## 2019-06-04 ENCOUNTER — PATIENT MESSAGE (OUTPATIENT)
Dept: OBSTETRICS AND GYNECOLOGY | Facility: CLINIC | Age: 29
End: 2019-06-04

## 2019-06-17 ENCOUNTER — PATIENT MESSAGE (OUTPATIENT)
Dept: OBSTETRICS AND GYNECOLOGY | Facility: CLINIC | Age: 29
End: 2019-06-17

## 2019-08-16 DIAGNOSIS — M25.562 LEFT KNEE PAIN, UNSPECIFIED CHRONICITY: Primary | ICD-10-CM

## 2019-09-04 ENCOUNTER — HOSPITAL ENCOUNTER (OUTPATIENT)
Dept: RADIOLOGY | Facility: HOSPITAL | Age: 29
Discharge: HOME | End: 2019-09-04
Attending: PHYSICIAN ASSISTANT
Payer: COMMERCIAL

## 2019-09-04 ENCOUNTER — OFFICE VISIT (OUTPATIENT)
Dept: ORTHOPEDICS | Facility: CLINIC | Age: 29
End: 2019-09-04
Payer: COMMERCIAL

## 2019-09-04 VITALS
HEIGHT: 64 IN | WEIGHT: 218 LBS | HEART RATE: 79 BPM | BODY MASS INDEX: 37.22 KG/M2 | DIASTOLIC BLOOD PRESSURE: 86 MMHG | SYSTOLIC BLOOD PRESSURE: 128 MMHG

## 2019-09-04 DIAGNOSIS — M25.562 ACUTE PAIN OF LEFT KNEE: Primary | ICD-10-CM

## 2019-09-04 DIAGNOSIS — M25.562 LEFT KNEE PAIN, UNSPECIFIED CHRONICITY: ICD-10-CM

## 2019-09-04 PROCEDURE — 3008F BODY MASS INDEX DOCD: CPT | Mod: CPTII,S$GLB,, | Performed by: PHYSICIAN ASSISTANT

## 2019-09-04 PROCEDURE — 73562 X-RAY EXAM OF KNEE 3: CPT | Mod: TC,RT,59

## 2019-09-04 PROCEDURE — 20610 DRAIN/INJ JOINT/BURSA W/O US: CPT | Mod: LT,S$GLB,, | Performed by: PHYSICIAN ASSISTANT

## 2019-09-04 PROCEDURE — 99999 PR PBB SHADOW E&M-EST. PATIENT-LVL IV: CPT | Mod: PBBFAC,,, | Performed by: PHYSICIAN ASSISTANT

## 2019-09-04 PROCEDURE — 73562 X-RAY EXAM OF KNEE 3: CPT | Mod: 26,59,RT, | Performed by: RADIOLOGY

## 2019-09-04 PROCEDURE — 73562 XR KNEE ORTHO LEFT WITH FLEXION: ICD-10-PCS | Mod: 26,59,RT, | Performed by: RADIOLOGY

## 2019-09-04 PROCEDURE — 99214 PR OFFICE/OUTPT VISIT, EST, LEVL IV, 30-39 MIN: ICD-10-PCS | Mod: 25,S$GLB,, | Performed by: PHYSICIAN ASSISTANT

## 2019-09-04 PROCEDURE — 99214 OFFICE O/P EST MOD 30 MIN: CPT | Mod: 25,S$GLB,, | Performed by: PHYSICIAN ASSISTANT

## 2019-09-04 PROCEDURE — 20610 PR DRAIN/INJECT LARGE JOINT/BURSA: ICD-10-PCS | Mod: LT,S$GLB,, | Performed by: PHYSICIAN ASSISTANT

## 2019-09-04 PROCEDURE — 3008F PR BODY MASS INDEX (BMI) DOCUMENTED: ICD-10-PCS | Mod: CPTII,S$GLB,, | Performed by: PHYSICIAN ASSISTANT

## 2019-09-04 PROCEDURE — 73564 X-RAY EXAM KNEE 4 OR MORE: CPT | Mod: 26,LT,, | Performed by: RADIOLOGY

## 2019-09-04 PROCEDURE — 73564 XR KNEE ORTHO LEFT WITH FLEXION: ICD-10-PCS | Mod: 26,LT,, | Performed by: RADIOLOGY

## 2019-09-04 PROCEDURE — 99999 PR PBB SHADOW E&M-EST. PATIENT-LVL IV: ICD-10-PCS | Mod: PBBFAC,,, | Performed by: PHYSICIAN ASSISTANT

## 2019-09-04 RX ORDER — METHYLPREDNISOLONE ACETATE 80 MG/ML
80 INJECTION, SUSPENSION INTRA-ARTICULAR; INTRALESIONAL; INTRAMUSCULAR; SOFT TISSUE ONCE
Status: COMPLETED | OUTPATIENT
Start: 2019-09-04 | End: 2019-09-04

## 2019-09-04 RX ADMIN — METHYLPREDNISOLONE ACETATE 80 MG: 80 INJECTION, SUSPENSION INTRA-ARTICULAR; INTRALESIONAL; INTRAMUSCULAR; SOFT TISSUE at 08:09

## 2019-09-04 NOTE — PROGRESS NOTES
Patient ID: Alyssa Urena is a 28 y.o. female.    Chief Complaint: Pain and Follow-up of the Left Knee      HPI: Alyssa Urena  is a 28 y.o. female who c/o Pain and Follow-up of the Left Knee   for duration of couple of weeks.  She denies any inciting injury.  Pain level at worst is 3/10.  Quality is sharp and intermittent.  Alleviating factors include ibuprofen 400 mg as well as rest.  Aggravating factors include prolonged weight-bearing.  She denies associated instability as well as swelling.    Past Medical History:   Diagnosis Date    Abnormal Pap smear of cervix     Chest pain syndrome 8/18/2017    FH: CAD (coronary artery disease) 8/18/2017     Past Surgical History:   Procedure Laterality Date    INTRAUTERINE DEVICE INSERTION      Mirena removed 06/2018    OOPHORECTOMY Left 2014     Family History   Problem Relation Age of Onset    Cataracts Maternal Grandmother     Diabetes Maternal Grandmother     Cataracts Maternal Grandfather     Glaucoma Maternal Grandfather     Hypertension Mother     Coronary artery disease Father         MI age 40s    Heart attacks under age 50 Father     Coronary artery disease Paternal Uncle         age 30s    Cataracts Paternal Uncle     Multiple sclerosis Sister      Social History     Socioeconomic History    Marital status:      Spouse name: Not on file    Number of children: Not on file    Years of education: Not on file    Highest education level: Not on file   Occupational History    Not on file   Social Needs    Financial resource strain: Not on file    Food insecurity:     Worry: Not on file     Inability: Not on file    Transportation needs:     Medical: Not on file     Non-medical: Not on file   Tobacco Use    Smoking status: Never Smoker    Smokeless tobacco: Never Used   Substance and Sexual Activity    Alcohol use: No    Drug use: No    Sexual activity: Yes     Partners: Male     Birth control/protection:  None   Lifestyle    Physical activity:     Days per week: Not on file     Minutes per session: Not on file    Stress: Not on file   Relationships    Social connections:     Talks on phone: Not on file     Gets together: Not on file     Attends Mu-ism service: Not on file     Active member of club or organization: Not on file     Attends meetings of clubs or organizations: Not on file     Relationship status: Not on file   Other Topics Concern    Not on file   Social History Narrative    No smokers in household, 1 dog.     Medication List with Changes/Refills   Current Medications    ACYCLOVIR (ZOVIRAX) 400 MG TABLET    Take 1 tablet (400 mg total) by mouth 3 (three) times daily with meals. for 5 days    LAMOTRIGINE (LAMICTAL) 100 MG TABLET    Take 1 tablet by mouth daily    LITHIUM (ESKALITH) 300 MG CAPSULE    Take 1 capsule each morning and 2 capsules each evening    LORAZEPAM (ATIVAN) 1 MG TABLET    Take 1 tablet (1 mg total) by mouth daily as needed (panic attack).    OXYMETAZOLINE (AFRIN, OXYMETAZOLINE,) 0.05 % NASAL SPRAY    Use 2 sprays in each nostril 1 time daily each evening as needed for congestion. DO NOT USE FOR MORE THAN 4 DAYS.    PRENATAL /IRON/FOLIC ACID (PRENATAL MULTI ORAL)    Take 1 tablet by mouth once daily.    PROMETHAZINE (PHENERGAN) 25 MG TABLET    Take 1 tablet (25 mg total) by mouth every 6 (six) hours as needed for Nausea.    TRAZODONE (DESYREL) 100 MG TABLET    Take 1/2 to 1 tablet at bedtime as needed for sleep.    VENLAFAXINE (EFFEXOR-XR) 75 MG 24 HR CAPSULE    Take 1 daily for 7 days then 2 daily thereafter     Review of patient's allergies indicates:   Allergen Reactions    Celebrex [celecoxib] Shortness Of Breath    Peanut Hives           Objective:        General    Nursing note and vitals reviewed.  Constitutional: She is oriented to person, place, and time. She appears well-developed and well-nourished.   HENT:   Head: Normocephalic and atraumatic.   Eyes: EOM are  normal.   Cardiovascular: Normal rate and regular rhythm.    Pulmonary/Chest: Effort normal.   Abdominal: Soft.   Neurological: She is alert and oriented to person, place, and time.   Psychiatric: She has a normal mood and affect. Her behavior is normal.           Right Knee Exam     Range of Motion   Extension: normal   Flexion: normal     Tests   Ligament Examination Lachman: normal (-1 to 2mm) PCL-Posterior Drawer: normal (0 to 2mm)     MCL - Valgus: normal (0 to 2mm)  LCL - Varus: normal    Other   Sensation: normal    Left Knee Exam     Inspection   Erythema: absent  Swelling: absent  Effusion: absent  Deformity: absent  Bruising: absent    Tenderness   The patient is experiencing no tenderness.     Range of Motion   Extension: normal   Flexion: normal     Tests   Meniscus   Tony:  Medial - negative Lateral - negative  Stability Lachman: normal (-1 to 2mm) PCL-Posterior Drawer: normal (0 to 2mm)  MCL - Valgus: normal (0 to 2mm)  LCL - Varus: normal (0 to 2mm)  Patella   Patellar apprehension: negative  Patellar Tracking: normal  Patellar Grind: negative    Other   Meniscal Cyst: absent  Popliteal (Baker's) Cyst: absent  Sensation: normal    Comments:  Comp soft, cap refill < 2 sec.    Muscle Strength   Right Lower Extremity   Quadriceps:  5/5   Hamstrin/5   Left Lower Extremity   Quadriceps:  5/5   Hamstrin/5     Vascular Exam       Edema  Right Lower Leg: absent  Left Lower Leg: absent            Xray Images and report were reviewed today.  I agree with the radiologist's interpretation.    X-ray Knee Ortho Left with Flexion  Narrative: EXAMINATION:  XR KNEE ORTHO LEFT WITH FLEXION    CLINICAL HISTORY:  Pain in left knee    TECHNIQUE:  AP standing views of both knees, AP flexion views of both knees, lateral view of the left knee and Merchant views of both knees    COMPARISON:  2017    FINDINGS:  The joint spaces of all 3 compartments of the left knee are well maintained.  No joint effusion.   No acute fracture or dislocation.  Impression: 1.  As above    Electronically signed by: Garfield Estrada DO  Date:    09/04/2019  Time:    08:38        Assessment:       Encounter Diagnosis   Name Primary?    Acute pain of left knee Yes          Plan:       Alyssa was seen today for pain and follow-up.    Diagnoses and all orders for this visit:    Acute pain of left knee  -     methylPREDNISolone acetate injection 80 mg        Alyssa Urena is an established pt who comes in today for evaluation of a new problem as above. Aside from having sharp pain, she is not having any meniscal symptoms.  She has no meniscal findings on exam.  We will optimize conservative treatment.  We discussed risks and benefits of a steroid injection.  She wishes to proceed with that today.  Additionally, I have given her a home exercise program to work on quad strengthening.  We will fit her with a neoprene hinged knee brace today. She may continue ibuprofen 400 mg every 4 hr as needed. If she is not doing any better in the next 4-6 weeks, she will notify the office.  At that time may suggest further diagnostic workup.  She verbalizes understanding and agrees.    Follow up in about 6 weeks (around 10/16/2019) for recheck if sxs persist.    Left Knee Injection Report:  After verbal consent was obtained for left knee injection, patient ID, site, and side were verified.  The  left  knee was sterilly prepped in the standard fashion.  A 22-gauge needle was introduced into left knee joint from an jayce-lateral site without complication. The left knee was then injected with 20 mg lidocaine plain and 80 mg depomedrol.  A sterile bandaid was applied.  The patient was informed to apply an ice pack approximately 10min once arriving home and not to do anything strenuous for 24hours.  She was instructed to call if there were any problems. The patient was discharged in stable condition.    The patient understands, chooses and consents to  this plan and accepts all   the risks which include but are not limited to the risks mentioned above.     Disclaimer: This note was prepared using a voice recognition system and is likely to have sound alike errors within the text.

## 2019-11-22 ENCOUNTER — OFFICE VISIT (OUTPATIENT)
Dept: OBSTETRICS AND GYNECOLOGY | Facility: CLINIC | Age: 29
End: 2019-11-22
Payer: COMMERCIAL

## 2019-11-22 ENCOUNTER — LAB VISIT (OUTPATIENT)
Dept: LAB | Facility: HOSPITAL | Age: 29
End: 2019-11-22
Attending: OBSTETRICS & GYNECOLOGY
Payer: COMMERCIAL

## 2019-11-22 VITALS
SYSTOLIC BLOOD PRESSURE: 110 MMHG | DIASTOLIC BLOOD PRESSURE: 76 MMHG | HEIGHT: 64 IN | BODY MASS INDEX: 36.51 KG/M2 | WEIGHT: 213.88 LBS

## 2019-11-22 DIAGNOSIS — Z31.69 INFERTILITY COUNSELING: Primary | ICD-10-CM

## 2019-11-22 DIAGNOSIS — Z31.69 INFERTILITY COUNSELING: ICD-10-CM

## 2019-11-22 LAB
PROGEST SERPL-MCNC: 7.2 NG/ML
TSH SERPL DL<=0.005 MIU/L-ACNC: 1.4 UIU/ML (ref 0.4–4)

## 2019-11-22 PROCEDURE — 83520 IMMUNOASSAY QUANT NOS NONAB: CPT

## 2019-11-22 PROCEDURE — 3008F PR BODY MASS INDEX (BMI) DOCUMENTED: ICD-10-PCS | Mod: CPTII,S$GLB,, | Performed by: OBSTETRICS & GYNECOLOGY

## 2019-11-22 PROCEDURE — 99999 PR PBB SHADOW E&M-EST. PATIENT-LVL III: ICD-10-PCS | Mod: PBBFAC,,, | Performed by: OBSTETRICS & GYNECOLOGY

## 2019-11-22 PROCEDURE — 99212 PR OFFICE/OUTPT VISIT, EST, LEVL II, 10-19 MIN: ICD-10-PCS | Mod: S$GLB,,, | Performed by: OBSTETRICS & GYNECOLOGY

## 2019-11-22 PROCEDURE — 36415 COLL VENOUS BLD VENIPUNCTURE: CPT

## 2019-11-22 PROCEDURE — 3008F BODY MASS INDEX DOCD: CPT | Mod: CPTII,S$GLB,, | Performed by: OBSTETRICS & GYNECOLOGY

## 2019-11-22 PROCEDURE — 84443 ASSAY THYROID STIM HORMONE: CPT

## 2019-11-22 PROCEDURE — 99999 PR PBB SHADOW E&M-EST. PATIENT-LVL III: CPT | Mod: PBBFAC,,, | Performed by: OBSTETRICS & GYNECOLOGY

## 2019-11-22 PROCEDURE — 84144 ASSAY OF PROGESTERONE: CPT

## 2019-11-22 PROCEDURE — 99212 OFFICE O/P EST SF 10 MIN: CPT | Mod: S$GLB,,, | Performed by: OBSTETRICS & GYNECOLOGY

## 2019-11-22 NOTE — PROGRESS NOTES
Subjective:       Patient ID: Alyssa Urena is a 28 y.o. female.    Chief Complaint:  Infertility      History of Present Illness  HPI  Pt reports that she has been trying to conceive for the past 10 months and is concerned because she has not succeeded.  Menses are regular and she has been tracking her ovulation with a phone mylene.  Had a SAB in 2018.  Pt also had Mirena removal in summer of 2018.  History of ectopic with salpingectomy.    GYN & OB History  Patient's last menstrual period was 10/30/2019.   Date of Last Pap: 2019    OB History    Para Term  AB Living   3 1 1   2 1   SAB TAB Ectopic Multiple Live Births   1   1   1      # Outcome Date GA Lbr Evaristo/2nd Weight Sex Delivery Anes PTL Lv   3 SAB 2018           2 Ectopic 2013           1 Term 2009    F Vag-Spont   ALESHIA       Review of Systems  Review of Systems   Constitutional: Negative for activity change, appetite change, chills, fatigue, fever and unexpected weight change.   Respiratory: Negative for shortness of breath.    Cardiovascular: Negative for chest pain, palpitations and leg swelling.   Gastrointestinal: Negative for abdominal pain, bloating, blood in stool, constipation, diarrhea, nausea and vomiting.   Genitourinary: Negative for dysmenorrhea, dyspareunia, dysuria, flank pain, frequency, genital sores, hematuria, menorrhagia, menstrual problem, pelvic pain, urgency, vaginal bleeding, vaginal discharge, vaginal pain, urinary incontinence, postcoital bleeding, vaginal dryness and vaginal odor.   Musculoskeletal: Negative for back pain.   Neurological: Negative for syncope and headaches.           Objective:    Physical Exam:   Constitutional: She is oriented to person, place, and time. She appears well-developed and well-nourished. No distress.                           Neurological: She is alert and oriented to person, place, and time.     Psychiatric: She has a normal mood and affect. Her behavior is normal.  Thought content normal.          Assessment:        1. Infertility counseling             Plan:      Infertility counseling  -     TSH; Future; Expected date: 11/22/2019  -     Follicle stimulating hormone; Future; Expected date: 11/22/2019  -     Progesterone; Future; Expected date: 11/22/2019  -     Antimullerian hormone (AMH); Future; Expected date: 11/22/2019  -     -     Pt was counseled on infertility, including the many factors that influence it.  Risk factors for infertility: obesity, history of ectopic, history of endometriosis.  Pt was counseled on stress reduction and lifestyle modifications (healthy diet, exercise, consistent sleep hours, etc).  Pt also advised on keeping an accurate menstrual log, use of ovulation test kits, and timing of intercourse.  If no pregnancy in the next 6 months, recommend that partner submit a semen analysis.  Will proceed with initial lab testing.  Will await results.  If all results negative and no pregnancy in the next 6 months, would consider HSG.  -     Pt was counseled on the link between obesity and infertility.  Recommend a healthy diet and exercise regimen with goal 20% weight loss (42 lb).      Follow up if symptoms worsen or fail to improve.

## 2019-11-26 ENCOUNTER — OFFICE VISIT (OUTPATIENT)
Dept: INTERNAL MEDICINE | Facility: CLINIC | Age: 29
End: 2019-11-26
Payer: COMMERCIAL

## 2019-11-26 VITALS
HEIGHT: 64 IN | SYSTOLIC BLOOD PRESSURE: 132 MMHG | OXYGEN SATURATION: 99 % | DIASTOLIC BLOOD PRESSURE: 84 MMHG | TEMPERATURE: 98 F | RESPIRATION RATE: 16 BRPM | BODY MASS INDEX: 35.95 KG/M2 | WEIGHT: 210.56 LBS | HEART RATE: 79 BPM

## 2019-11-26 DIAGNOSIS — R68.89 FLU-LIKE SYMPTOMS: ICD-10-CM

## 2019-11-26 DIAGNOSIS — J02.9 SORE THROAT: Primary | ICD-10-CM

## 2019-11-26 LAB
DEPRECATED S PYO AG THROAT QL EIA: NEGATIVE
INFLUENZA A, MOLECULAR: NEGATIVE
INFLUENZA B, MOLECULAR: NEGATIVE
MIS SERPL-MCNC: 0.7 NG/ML (ref 0.9–9.5)
SPECIMEN SOURCE: NORMAL

## 2019-11-26 PROCEDURE — 96372 PR INJECTION,THERAP/PROPH/DIAG2ST, IM OR SUBCUT: ICD-10-PCS | Mod: S$GLB,,, | Performed by: FAMILY MEDICINE

## 2019-11-26 PROCEDURE — 3008F PR BODY MASS INDEX (BMI) DOCUMENTED: ICD-10-PCS | Mod: CPTII,S$GLB,, | Performed by: FAMILY MEDICINE

## 2019-11-26 PROCEDURE — 99999 PR PBB SHADOW E&M-EST. PATIENT-LVL IV: CPT | Mod: PBBFAC,,, | Performed by: FAMILY MEDICINE

## 2019-11-26 PROCEDURE — 99999 PR PBB SHADOW E&M-EST. PATIENT-LVL IV: ICD-10-PCS | Mod: PBBFAC,,, | Performed by: FAMILY MEDICINE

## 2019-11-26 PROCEDURE — 99214 OFFICE O/P EST MOD 30 MIN: CPT | Mod: 25,S$GLB,, | Performed by: FAMILY MEDICINE

## 2019-11-26 PROCEDURE — 3008F BODY MASS INDEX DOCD: CPT | Mod: CPTII,S$GLB,, | Performed by: FAMILY MEDICINE

## 2019-11-26 PROCEDURE — 87502 INFLUENZA DNA AMP PROBE: CPT

## 2019-11-26 PROCEDURE — 99214 PR OFFICE/OUTPT VISIT, EST, LEVL IV, 30-39 MIN: ICD-10-PCS | Mod: 25,S$GLB,, | Performed by: FAMILY MEDICINE

## 2019-11-26 PROCEDURE — 87880 STREP A ASSAY W/OPTIC: CPT

## 2019-11-26 PROCEDURE — 96372 THER/PROPH/DIAG INJ SC/IM: CPT | Mod: S$GLB,,, | Performed by: FAMILY MEDICINE

## 2019-11-26 PROCEDURE — 87081 CULTURE SCREEN ONLY: CPT

## 2019-11-26 RX ORDER — BETAMETHASONE SODIUM PHOSPHATE AND BETAMETHASONE ACETATE 3; 3 MG/ML; MG/ML
9 INJECTION, SUSPENSION INTRA-ARTICULAR; INTRALESIONAL; INTRAMUSCULAR; SOFT TISSUE
Status: COMPLETED | OUTPATIENT
Start: 2019-11-26 | End: 2019-11-26

## 2019-11-26 RX ADMIN — BETAMETHASONE SODIUM PHOSPHATE AND BETAMETHASONE ACETATE 9 MG: 3; 3 INJECTION, SUSPENSION INTRA-ARTICULAR; INTRALESIONAL; INTRAMUSCULAR; SOFT TISSUE at 04:11

## 2019-11-26 NOTE — PROGRESS NOTES
Subjective:   Patient ID: Alyssa Urena is a 28 y.o. female.  Chief Complaint:  Diarrhea; Dizziness; Generalized Body Aches; Nausea; Emesis; Neck Pain; and Headache      Presents wishing sore throat and flu-like symptoms.    Had flu vaccine October 2019.    No known flu or strep exposure.    March 2019 treated for viral URI with Celestone, Tessalon, Flonase.    November 2018 treated gastroenteritis with Phenergan.    Reports this feels different.  More significant myalgias and sore throat.  Overall weakness and poor feeling.  Minimal cough.    Sore Throat    This is a new problem. The current episode started yesterday. The problem has been gradually worsening. Neither side of throat is experiencing more pain than the other. Maximum temperature: Subjective. The fever has been present for 1 to 2 days. The pain is at a severity of 5/10. The pain is moderate. Associated symptoms include abdominal pain, diarrhea, headaches, neck pain, swollen glands and vomiting. Pertinent negatives include no congestion, coughing, drooling, ear discharge, ear pain, hoarse voice, plugged ear sensation, shortness of breath, stridor or trouble swallowing. She has had no exposure to strep or mono. She has tried acetaminophen for the symptoms. The treatment provided no relief.   Influenza   This is a new problem. The current episode started in the past 7 days. The problem occurs constantly. The problem has been gradually worsening. Associated symptoms include abdominal pain, a change in bowel habit, chills, diaphoresis, fatigue, a fever, headaches, myalgias, nausea, neck pain, a sore throat, swollen glands, vomiting and weakness. Pertinent negatives include no anorexia, arthralgias, chest pain, congestion, coughing, joint swelling, numbness, rash, urinary symptoms, vertigo or visual change. Nothing aggravates the symptoms. She has tried acetaminophen for the symptoms. The treatment provided mild relief.       Current Outpatient  "Medications:     (Magic mouthwash) 1:1:1 Benadryl 12.5mg/5ml liq, aluminum & magnesium hydroxide-simehticone (Maalox), lidocaine viscous 2%, Swish and spit 5 mLs every 6 (six) hours as needed., Disp: 90 mL, Rfl: 0    acyclovir (ZOVIRAX) 400 MG tablet, Take 1 tablet (400 mg total) by mouth 3 (three) times daily with meals. for 5 days, Disp: 15 tablet, Rfl: 5    LORazepam (ATIVAN) 1 MG tablet, Take 1 tablet (1 mg total) by mouth daily as needed (panic attack)., Disp: 15 tablet, Rfl: 0    prenatal no122/iron/folic acid (PRENATAL MULTI ORAL), Take 1 tablet by mouth once daily., Disp: , Rfl:   No current facility-administered medications for this visit.     Review of Systems   Constitutional: Positive for activity change, appetite change, chills, diaphoresis, fatigue and fever.   HENT: Positive for sore throat. Negative for congestion, dental problem, drooling, ear discharge, ear pain, hoarse voice, postnasal drip, rhinorrhea, sinus pressure, sinus pain, sneezing, trouble swallowing and voice change.    Eyes: Negative for discharge, redness, itching and visual disturbance.   Respiratory: Negative for cough, chest tightness, shortness of breath, wheezing and stridor.    Cardiovascular: Negative for chest pain and leg swelling.   Gastrointestinal: Positive for abdominal pain, change in bowel habit, diarrhea, nausea and vomiting. Negative for anorexia.   Genitourinary: Negative for difficulty urinating.   Musculoskeletal: Positive for myalgias and neck pain. Negative for arthralgias, joint swelling and neck stiffness.   Skin: Negative for rash.   Neurological: Positive for weakness and headaches. Negative for dizziness, vertigo, light-headedness and numbness.   Hematological: Positive for adenopathy.     Objective:   /84 (BP Location: Left arm, Patient Position: Sitting, BP Method: Large (Manual))   Pulse 79   Temp 97.9 °F (36.6 °C) (Tympanic)   Resp 16   Ht 5' 4" (1.626 m)   Wt 95.5 kg (210 lb 8.6 oz)   LMP " 10/30/2019   SpO2 99%   BMI 36.14 kg/m²     Physical Exam   Constitutional: Vital signs are normal. She appears well-developed and well-nourished.  Non-toxic appearance. She has a sickly appearance. She appears ill. No distress.   HENT:   Right Ear: Hearing, tympanic membrane, external ear and ear canal normal.   Left Ear: Hearing, tympanic membrane, external ear and ear canal normal.   Nose: Mucosal edema and rhinorrhea present. Right sinus exhibits no maxillary sinus tenderness and no frontal sinus tenderness. Left sinus exhibits no maxillary sinus tenderness and no frontal sinus tenderness.   Mouth/Throat: Uvula is midline and mucous membranes are normal. Oropharyngeal exudate, posterior oropharyngeal edema and posterior oropharyngeal erythema present. No tonsillar abscesses. Tonsils are 3+ on the right. Tonsils are 3+ on the left. No tonsillar exudate.   Eyes: Conjunctivae are normal. Right conjunctiva is not injected. Right conjunctiva has no hemorrhage. Left conjunctiva is not injected. Left conjunctiva has no hemorrhage.   Neck: Normal range of motion and full passive range of motion without pain. Neck supple.   Cardiovascular: Normal rate, regular rhythm, S1 normal, S2 normal and normal heart sounds.   Pulmonary/Chest: Effort normal and breath sounds normal. No accessory muscle usage. No tachypnea. No respiratory distress. She has no decreased breath sounds. She has no wheezes. She has no rhonchi. She has no rales.   Abdominal: Soft. She exhibits no distension. There is no tenderness. There is no rebound, no guarding and no CVA tenderness.   Lymphadenopathy:     She has cervical adenopathy.        Right cervical: Posterior cervical adenopathy present.        Left cervical: Posterior cervical adenopathy present.   Skin: No rash noted.   Psychiatric: She has a normal mood and affect.   Nursing note and vitals reviewed.    Assessment:       ICD-10-CM ICD-9-CM   1. Sore throat J02.9 462   2. Flu-like symptoms  R68.89 780.99     Plan:   Sore throat  -     Throat Screen, Rapid  -     betamethasone acetate-betamethasone sodium phosphate injection 9 mg  -     (Magic mouthwash) 1:1:1 Benadryl 12.5mg/5ml liq, aluminum & magnesium hydroxide-simehticone (Maalox), lidocaine viscous 2%; Swish and spit 5 mLs every 6 (six) hours as needed.  Dispense: 90 mL; Refill: 0  Rapid strep test negative.    Viral pharyngitis.    No antibiotics indicated.    Confirmatory throat culture pending.  Celestone injection office  Magic mouthwash for sore throat  Tylenol or Motrin for fever and myalgias.      Flu-like symptoms  -     Influenza A & B by Molecular  Influenza A and B test negative.   No Tamiflu indicated.    Work excuse provided.   Unclear why patient stopped majority of psychiatric medications.    Recommend she follow up with Psychiatry.   Return to clinic as needed.

## 2019-11-26 NOTE — LETTER
November 26, 2019      [facility]  [address]  [place] [address]  Phone: [phone]  Fax: [phone]       Patient: Alyssa Urena   YOB: 1990  Date of Visit: 11/26/2019    To Whom It May Concern:    Racheal Urena  was at [facility] on 11/26/2019. She may return to work/school on 12/3/2019 with no restrictions. If you have any questions or concerns, or if I can be of further assistance, please do not hesitate to contact me.    Sincerely,    Jeff Stevens MD

## 2019-11-27 ENCOUNTER — PATIENT MESSAGE (OUTPATIENT)
Dept: OBSTETRICS AND GYNECOLOGY | Facility: CLINIC | Age: 29
End: 2019-11-27

## 2019-11-29 ENCOUNTER — LAB VISIT (OUTPATIENT)
Dept: LAB | Facility: HOSPITAL | Age: 29
End: 2019-11-29
Attending: OBSTETRICS & GYNECOLOGY
Payer: COMMERCIAL

## 2019-11-29 DIAGNOSIS — Z31.69 INFERTILITY COUNSELING: ICD-10-CM

## 2019-11-29 LAB
BACTERIA THROAT CULT: NORMAL
FSH SERPL-ACNC: 17.1 MIU/ML

## 2019-11-29 PROCEDURE — 36415 COLL VENOUS BLD VENIPUNCTURE: CPT

## 2019-11-29 PROCEDURE — 83001 ASSAY OF GONADOTROPIN (FSH): CPT

## 2019-12-30 ENCOUNTER — PATIENT MESSAGE (OUTPATIENT)
Dept: OBSTETRICS AND GYNECOLOGY | Facility: CLINIC | Age: 29
End: 2019-12-30

## 2020-03-30 ENCOUNTER — TELEPHONE (OUTPATIENT)
Dept: OBSTETRICS AND GYNECOLOGY | Facility: CLINIC | Age: 30
End: 2020-03-30

## 2020-03-30 NOTE — TELEPHONE ENCOUNTER
----- Message from Bree Godoy sent at 3/30/2020 11:09 AM CDT -----  Contact: PT   Type:  Patient Returning Call    Who Called: Pt   Who Left Message for Patient: [email or web address]   Does the patient know what this is regarding?: yes   Would the patient rather a call back or a response via [facility]? Call back   Best Call Back Number: [phone] (home)   Additional Information: n/a

## 2020-03-30 NOTE — TELEPHONE ENCOUNTER
Returned call to patient.  She requested a sooner virtual visit appt for infertility and cyst, per patient.  Appt rescheduled for 04/01/20 at 9:45 am, she confirmed appt.

## 2020-04-01 ENCOUNTER — OFFICE VISIT (OUTPATIENT)
Dept: OBSTETRICS AND GYNECOLOGY | Facility: CLINIC | Age: 30
End: 2020-04-01
Payer: COMMERCIAL

## 2020-04-01 DIAGNOSIS — N93.9 ABNORMAL UTERINE BLEEDING (AUB): Primary | ICD-10-CM

## 2020-04-01 DIAGNOSIS — N89.8 VAGINAL CYST: ICD-10-CM

## 2020-04-01 PROCEDURE — 99212 OFFICE O/P EST SF 10 MIN: CPT | Mod: 95,,, | Performed by: OBSTETRICS & GYNECOLOGY

## 2020-04-01 PROCEDURE — 99212 PR OFFICE/OUTPT VISIT, EST, LEVL II, 10-19 MIN: ICD-10-PCS | Mod: 95,,, | Performed by: OBSTETRICS & GYNECOLOGY

## 2020-04-01 NOTE — PROGRESS NOTES
The patient location is: work ([facility])  The chief complaint leading to consultation is: vaginal cyst is growing, discuss fertility, abnormal period in 02/2020  Visit type: Virtual visit with synchronous audio and video  Total time spent with patient: 10 minutes  Each patient to whom he or she provides medical services by telemedicine is:  (1) informed of the relationship between the physician and patient and the respective role of any other health care provider with respect to management of the patient; and (2) notified that he or she may decline to receive medical services by telemedicine and may withdraw from such care at any time.    Notes: Pt complains of vaginal cyst that appears to be growing.  Pt reported no issues with this cyst before but now is starting to become uncomfortable with tampon insertion and with some underwear.  Is about size of a grape.  Denies pain, fever, or drainage.  Pt reassured and advised to take Sitz baths with Epsom salts and monitor progress for now.  Call back if symptoms significantly worsen.  Pt also reports complaints of having a heavy and prolonged (8-10 days) period in 02/2020.  Menses are normally regular with periods lasting 5 days.  Denies excessive bleeding or cramping with her usual menses.  This was an isolated event.  Lastly, pt states that she was trying for pregnancy but has temporarily placed these plans on hold due to COVID pandemic.  Pt was reassured that this is likely an isolated event and was advised on options, including expectant vs OCP.  Pt desires to think about options and will call back with decision.    Follow up prn.

## 2020-04-22 ENCOUNTER — OFFICE VISIT (OUTPATIENT)
Dept: PSYCHIATRY | Facility: CLINIC | Age: 30
End: 2020-04-22
Payer: COMMERCIAL

## 2020-04-22 DIAGNOSIS — F31.81 BIPOLAR 2 DISORDER: Primary | Chronic | ICD-10-CM

## 2020-04-22 DIAGNOSIS — F41.9 ANXIETY: ICD-10-CM

## 2020-04-22 PROCEDURE — 99214 PR OFFICE/OUTPT VISIT, EST, LEVL IV, 30-39 MIN: ICD-10-PCS | Mod: 95,,, | Performed by: PSYCHIATRY & NEUROLOGY

## 2020-04-22 PROCEDURE — 99214 OFFICE O/P EST MOD 30 MIN: CPT | Mod: 95,,, | Performed by: PSYCHIATRY & NEUROLOGY

## 2020-04-22 RX ORDER — ESCITALOPRAM OXALATE 10 MG/1
10 TABLET ORAL DAILY
Qty: 30 TABLET | Refills: 2 | Status: SHIPPED | OUTPATIENT
Start: 2020-04-22 | End: 2022-12-02

## 2020-04-22 RX ORDER — LAMOTRIGINE 100 MG/1
100 TABLET ORAL DAILY
Qty: 30 TABLET | Refills: 2 | Status: SHIPPED | OUTPATIENT
Start: 2020-06-02 | End: 2020-06-18

## 2020-04-22 RX ORDER — LAMOTRIGINE 25 MG/1
TABLET ORAL
Qty: 84 TABLET | Refills: 0 | Status: SHIPPED | OUTPATIENT
Start: 2020-04-22 | End: 2020-06-18

## 2020-04-22 RX ORDER — LORAZEPAM 1 MG/1
1 TABLET ORAL EVERY 6 HOURS PRN
Qty: 30 TABLET | Refills: 1 | Status: SHIPPED | OUTPATIENT
Start: 2020-04-22 | End: 2020-06-18

## 2020-04-22 NOTE — PROGRESS NOTES
Outpatient Psychiatry Follow-up Visit (MD/NP)    4/22/2020    Alyssa Urena, a 29 y.o. female, presenting for follow-up visit. Met with patient.    Reason for Encounter: follow-up    IntervalHx: Patient seen & interviewed for follow-up, last seen about 15 months ago. Off medications for about a year, thought would be able to manage ok without treatment. Moods worse in recent months, however. Depressed - don't want to get out the bed. Same symptoms as with previous episodes. No suicidal thoughts. Reports she believes medications were helpful when took then previously, took them until about 1 year ago. Denies side effects from last regimen. Continues to work in healthcare. Coping ok amid COVID outbreak.  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 Family is doing ok. General health is ok. No new medication since last visit.     Background: 27 y/o F referred by PA for mood lability. First presented to [facility] in February: 3 week complaint of stress & anxiety at work... states that her job they're understaffed & she feels overworked most of the time... anxiety is intermittent moderate to severe intensity feeling of being overwhelmed. She states sometimes she has to just go to a room by herself... associated trouble sleeping stating that she wakes up many times during the night wondering about job duties. She says she is also getting an abbreviated amount of sleep since she usually has been about 10 or 11 PM and wakes up almost every morning at 3 AM. Was started on venlafaxine. She requested & received an increased dose in March. She had an evaluation for unexplained CP & dizziness in June (workup was unrevealing). Re-presented in July for ongoing mood symptoms. From BRIDGETTE Cruz's note (highlights mine): 27 y/o female c/o mood swings X couple weeks. Pt does not have a PCP. She reports intermittent mood swings from feeling happy to feeling very sad & crying. She reports she doesn't know why they are  "happening & reports no trauma or recent changes in her life. No new stressors. She denies any SI/HI. She reports she lives with her parents & they are supportive. She reports bipolar disorder runs in her family & she is concerned she may have this. She is taking Effexor daily. She also had chest pain with associated spinning dizziness about 1 month ago & was seen by a different provider at that time. EKG & CXR were normal & TSH was slightly elevated - FT4 normal. She reports early CAD family history & has not seen a cardiologist. She reports chest pain (which occurred randomly) seems to have resolved & she denies any current CP. She has taken Antivert for her dizziness with relief. She reports no palpitations, fever, chills, cough, edema, exertional sxs, abd pain, chance of pregnancy, N/V, diaphoresis, sxs of infection, or other medical complaints. She reports she does not smoke. Confirms this hx today, reports "my mood swings are crazy". "I go from super happy to super depressed  I don't know why." Experiences "dark thoughts" including suicidal thoughts & fantasies, but no plans or intent. Denies new stressors identified to trigger moods. Denies episodes prior to recent months. Moods initially swung between euthymic, sad without provocation for weeks but then between euthymic, anxious & sad, & most recently have began to be mostly irritable with tinge of anxiety, featuring frequent racing thoughts whitout pressured speech. Drinks daily - "it calms me". More irritable without it, relaxed with it. No withdrawal symptoms on discontinuation. No tolerance. Bottle of wine daily x 1 year. Rare beer or liquor. Wasn't originally to bring self relief. Had one hangover. No morning alcohol. None during workday. No illicits. No prescription misuse. PsychHx: as above; denies otherwise. MedHx: Heart starts racing, short of breath, crying spells. Triggered by stress. Doesn't happen at home. Never happens for no reason at all. " "Meds: effexor since February or March - sebas better with anxiety. "not a complete breakdown". antivert - workup with PCP. FamHx: GM - ; no substance abuse problems. SocHx: reviewed. bipolar/scz, aunt bipolar disorder. Works as medical assistant for orthopedic surgeons, "takes a lot of the messages".    Review Of Systems:     GENERAL:  +weight gain  SKIN:  No rashes or lacerations  HEAD:  No headaches  CHEST:  No shortness of breath, hyperventilation or cough  CARDIOVASCULAR:  No tachycardia or chest pain  ABDOMEN:  No nausea, vomiting, pain, constipation or diarrhea  URINARY:  No frequency, dysuria or sexual dysfunction  MUSCULOSKELETAL:  No pain or stiffness of the joints  NEUROLOGIC:  No weakness, sensory changes, seizures, confusion, memory loss, tremor or other abnormal movements    Current Evaluation:     Nutritional Screening: Considering the patient's height and weight, medications, medical history and preferences, should a referral be made to the dietitian? no    Constitutional  Vitals:  Most recent vital signs, dated less than 90 days prior to this appointment, were not reviewed.    There were no vitals filed for this visit.     General:  unremarkable, age appropriate     Musculoskeletal  Muscle Strength/Tone:  no dystonia, no tremor   Gait & Station:  non-ataxic     Psychiatric  Appearance: casually dressed & groomed;   Behavior: calm,   Cooperation: cooperative with assessment  Speech: normal rate, volume, tone  Thought Process: linear, goal-directed  Thought Content: No suicidal or homicidal ideation; no delusions  Affect: anxious  Mood: anxious  Perceptions: No auditory or visual hallucinations  Level of Consciousness: alert throughout interview  Insight: fair  Cognition: Oriented to person, place, time, & situation  Memory: no apparent deficits to general clinical interview; not formally assessed  Attention/Concentration: no apparent deficits to general clinical interview; not formally assessed  Fund " of Knowledge: average by vocabulary/education    Laboratory Data  No visits with results within 1 Month(s) from this visit.   Latest known visit with results is:   Lab Visit on 11/29/2019   Component Date Value Ref Range Status    Follicle Stimulating Hormone 11/29/2019 17.10  See Text mIU/mL Final     Medications  Outpatient Encounter Medications as of 4/22/2020   Medication Sig Dispense Refill    (Magic mouthwash) 1:1:1 Benadryl 12.5mg/5ml liq, aluminum & magnesium hydroxide-simehticone (Maalox), lidocaine viscous 2% Swish and spit 5 mLs every 6 (six) hours as needed. 90 mL 0    acyclovir (ZOVIRAX) 400 MG tablet Take 1 tablet (400 mg total) by mouth 3 (three) times daily with meals. for 5 days 15 tablet 5    LORazepam (ATIVAN) 1 MG tablet Take 1 tablet (1 mg total) by mouth daily as needed (panic attack). 15 tablet 0    prenatal no122/iron/folic acid (PRENATAL MULTI ORAL) Take 1 tablet by mouth once daily.       No facility-administered encounter medications on file as of 4/22/2020.      Assessment - Diagnosis - Goals:     Impression: intermittent mood episodes starting with anxiety but later featuring prolonged considerably lability, low moods, suicidal thoughts, irritability. Benefited from quetiapine but experienced considerable weight gain unresponsive to behavioral changes. Had several unprovoked panic attacks. Weight gain reduced on lurasidone but still present and therapeutic benefits were ok. More depressed off meds, desires restart of treatment    Dx: bipolar 2 disorder; associate anxiety.     Treatment Goals:  Specify outcomes written in observable, behavioral terms:   Decrease mood lability, foster euthymia by self-report, questionnaires    Treatment Plan/Recommendations:   · Lamotrigine for bipolar diosrder. escitalopram for anxiety. Ativan 1 mg daily prn panic attack. Discussed risks, benefits, & alternatives to treatment plan documented above with patient. I answered all patient questions  related to this plan & patient expressed understanding & agreement. Stop lamotrigine in case of new rash. She's agreeable to this.   · F/u, 2 months.     Return to Clinic: 2 months    HIMA Guillen MD

## 2020-04-29 ENCOUNTER — PATIENT MESSAGE (OUTPATIENT)
Dept: INTERNAL MEDICINE | Facility: CLINIC | Age: 30
End: 2020-04-29

## 2020-04-29 ENCOUNTER — PATIENT MESSAGE (OUTPATIENT)
Dept: OBSTETRICS AND GYNECOLOGY | Facility: CLINIC | Age: 30
End: 2020-04-29

## 2020-04-29 DIAGNOSIS — N76.0 BV (BACTERIAL VAGINOSIS): Primary | ICD-10-CM

## 2020-04-29 DIAGNOSIS — B96.89 BV (BACTERIAL VAGINOSIS): Primary | ICD-10-CM

## 2020-04-30 ENCOUNTER — OFFICE VISIT (OUTPATIENT)
Dept: INTERNAL MEDICINE | Facility: CLINIC | Age: 30
End: 2020-04-30
Payer: COMMERCIAL

## 2020-04-30 DIAGNOSIS — R63.8 SALT CRAVING: Primary | ICD-10-CM

## 2020-04-30 PROCEDURE — 99214 PR OFFICE/OUTPT VISIT, EST, LEVL IV, 30-39 MIN: ICD-10-PCS | Mod: 95,,, | Performed by: FAMILY MEDICINE

## 2020-04-30 PROCEDURE — 99214 OFFICE O/P EST MOD 30 MIN: CPT | Mod: 95,,, | Performed by: FAMILY MEDICINE

## 2020-04-30 RX ORDER — METRONIDAZOLE 500 MG/1
500 TABLET ORAL 2 TIMES DAILY
Qty: 14 TABLET | Refills: 0 | Status: SHIPPED | OUTPATIENT
Start: 2020-04-30 | End: 2020-05-07

## 2020-04-30 NOTE — PROGRESS NOTES
Subjective:   Patient ID: Alyssa Urena is a 29 y.o. female.  Chief Complaint:  No chief complaint on file.    The patient location is: Work  The chief complaint leading to consultation is: Craving Salt  Visit type: audiovisual  Total time spent with patient: 15 minutes  Each patient to whom he or she provides medical services by telemedicine is:  (1) informed of the relationship between the physician and patient and the respective role of any other health care provider with respect to management of the patient; and (2) notified that he or she may decline to receive medical services by telemedicine and may withdraw from such care at any time.    Patient presents for evaluation of craving salt  Denies any previous dietary cravings or irregular eating habits.    Does have mental health history, seeing Psychiatry, reports stable.  Recent medication change included Lexapro.  Has been on stable dose of Lamictal for a while.  Undergone gyn evaluation for irregular bleeding and trying to see possibility of getting pregnant.  Lab work may have been consistent with spontaneous primary ovarian insufficiency.  Recent STD testing on file and hormone levels, otherwise last labs 2018.    Complains of mouth tingling which resolves with salt.    Also with fatigue, weakness, tired all the time.  States different than previous mood related issues.    Review of Systems   Constitutional: Positive for activity change, appetite change and fatigue. Negative for chills, diaphoresis and fever.   HENT: Negative for congestion, dental problem, drooling, ear discharge, ear pain, postnasal drip, rhinorrhea, sinus pressure, sinus pain, sneezing, sore throat, trouble swallowing and voice change.    Eyes: Negative for discharge, redness, itching and visual disturbance.   Respiratory: Negative for cough, chest tightness, shortness of breath, wheezing and stridor.    Cardiovascular: Negative for chest pain and leg swelling.    Gastrointestinal: Negative for abdominal pain, diarrhea, nausea and vomiting.   Genitourinary: Negative for difficulty urinating.   Musculoskeletal: Positive for myalgias. Negative for arthralgias, joint swelling, neck pain and neck stiffness.   Skin: Negative for rash.   Neurological: Positive for dizziness, weakness and headaches. Negative for tremors, seizures, syncope, facial asymmetry, light-headedness and numbness.   Hematological: Positive for adenopathy.   Psychiatric/Behavioral: Positive for sleep disturbance. Negative for agitation, confusion, decreased concentration, dysphoric mood and hallucinations. The patient is not nervous/anxious and is not hyperactive.      Objective:   Physical Exam   Constitutional: She is oriented to person, place, and time. She appears well-developed and well-nourished.  Non-toxic appearance. She does not have a sickly appearance. She does not appear ill. No distress.   Eyes: Conjunctivae are normal. Right eye exhibits no discharge. Left eye exhibits no discharge. Right conjunctiva is not injected. Left conjunctiva is not injected. No scleral icterus.   Pulmonary/Chest: Effort normal. No accessory muscle usage. No tachypnea. No respiratory distress.   Musculoskeletal: She exhibits no edema.   Neurological: She is alert and oriented to person, place, and time.   Skin: No rash noted.   Psychiatric: She has a normal mood and affect. Her behavior is normal. Judgment and thought content normal.     Assessment:       ICD-10-CM ICD-9-CM   1. Salt craving R63.8 783.9     Plan:   Salt craving  -     CBC Without Differential; Future; Expected date: 04/30/2020  -     TSH; Future; Expected date: 04/30/2020  -     Vitamin D; Future; Expected date: 04/30/2020  -     Hemoglobin A1c; Future; Expected date: 04/30/2020  -     Basic metabolic panel; Future; Expected date: 04/30/2020  -     Hepatic function panel; Future; Expected date: 04/30/2020  -     Urinalysis; Future; Expected date:  04/30/2020    Check labs.  Treat as indicated.    If all normal, consider endocrinology referral for evaluation of primary adrenal insufficiency.    Patient at increase risk due to probable spontaneous primary ovarian insufficiency.  For now continue all present medications.    Patient expresses understanding and agreement to the above plan.

## 2020-05-01 ENCOUNTER — LAB VISIT (OUTPATIENT)
Dept: LAB | Facility: HOSPITAL | Age: 30
End: 2020-05-01
Payer: COMMERCIAL

## 2020-05-01 ENCOUNTER — LAB VISIT (OUTPATIENT)
Dept: LAB | Facility: HOSPITAL | Age: 30
End: 2020-05-01
Attending: FAMILY MEDICINE
Payer: COMMERCIAL

## 2020-05-01 DIAGNOSIS — R63.8 SALT CRAVING: ICD-10-CM

## 2020-05-01 LAB
25(OH)D3+25(OH)D2 SERPL-MCNC: 10 NG/ML (ref 30–96)
ALBUMIN SERPL BCP-MCNC: 3.6 G/DL (ref 3.5–5.2)
ALP SERPL-CCNC: 60 U/L (ref 55–135)
ALT SERPL W/O P-5'-P-CCNC: 8 U/L (ref 10–44)
ANION GAP SERPL CALC-SCNC: 9 MMOL/L (ref 8–16)
AST SERPL-CCNC: 11 U/L (ref 10–40)
BILIRUB DIRECT SERPL-MCNC: 0.2 MG/DL (ref 0.1–0.3)
BILIRUB SERPL-MCNC: 0.3 MG/DL (ref 0.1–1)
BILIRUB UR QL STRIP: NEGATIVE
BUN SERPL-MCNC: 5 MG/DL (ref 6–20)
CALCIUM SERPL-MCNC: 9 MG/DL (ref 8.7–10.5)
CHLORIDE SERPL-SCNC: 108 MMOL/L (ref 95–110)
CLARITY UR: CLEAR
CO2 SERPL-SCNC: 22 MMOL/L (ref 23–29)
COLOR UR: YELLOW
CREAT SERPL-MCNC: 0.9 MG/DL (ref 0.5–1.4)
ERYTHROCYTE [DISTWIDTH] IN BLOOD BY AUTOMATED COUNT: 15 % (ref 11.5–14.5)
EST. GFR  (AFRICAN AMERICAN): >60 ML/MIN/1.73 M^2
EST. GFR  (NON AFRICAN AMERICAN): >60 ML/MIN/1.73 M^2
ESTIMATED AVG GLUCOSE: 88 MG/DL (ref 68–131)
GLUCOSE SERPL-MCNC: 90 MG/DL (ref 70–110)
GLUCOSE UR QL STRIP: NEGATIVE
HBA1C MFR BLD HPLC: 4.7 % (ref 4–5.6)
HCT VFR BLD AUTO: 33.2 % (ref 37–48.5)
HGB BLD-MCNC: 9.5 G/DL (ref 12–16)
HGB UR QL STRIP: NEGATIVE
KETONES UR QL STRIP: NEGATIVE
LEUKOCYTE ESTERASE UR QL STRIP: NEGATIVE
MCH RBC QN AUTO: 24.5 PG (ref 27–31)
MCHC RBC AUTO-ENTMCNC: 28.6 G/DL (ref 32–36)
MCV RBC AUTO: 86 FL (ref 82–98)
NITRITE UR QL STRIP: NEGATIVE
PH UR STRIP: 7 [PH] (ref 5–8)
PLATELET # BLD AUTO: 259 K/UL (ref 150–350)
PMV BLD AUTO: 12.1 FL (ref 9.2–12.9)
POTASSIUM SERPL-SCNC: 3.9 MMOL/L (ref 3.5–5.1)
PROT SERPL-MCNC: 8 G/DL (ref 6–8.4)
PROT UR QL STRIP: NEGATIVE
RBC # BLD AUTO: 3.87 M/UL (ref 4–5.4)
SODIUM SERPL-SCNC: 139 MMOL/L (ref 136–145)
SP GR UR STRIP: 1.01 (ref 1–1.03)
TSH SERPL DL<=0.005 MIU/L-ACNC: 1.59 UIU/ML (ref 0.4–4)
URN SPEC COLLECT METH UR: NORMAL
WBC # BLD AUTO: 4.07 K/UL (ref 3.9–12.7)

## 2020-05-01 PROCEDURE — 82306 VITAMIN D 25 HYDROXY: CPT

## 2020-05-01 PROCEDURE — 83036 HEMOGLOBIN GLYCOSYLATED A1C: CPT

## 2020-05-01 PROCEDURE — 36415 COLL VENOUS BLD VENIPUNCTURE: CPT

## 2020-05-01 PROCEDURE — 84443 ASSAY THYROID STIM HORMONE: CPT

## 2020-05-01 PROCEDURE — 81003 URINALYSIS AUTO W/O SCOPE: CPT

## 2020-05-01 PROCEDURE — 80048 BASIC METABOLIC PNL TOTAL CA: CPT

## 2020-05-01 PROCEDURE — 85027 COMPLETE CBC AUTOMATED: CPT

## 2020-05-01 PROCEDURE — 80076 HEPATIC FUNCTION PANEL: CPT

## 2020-05-04 ENCOUNTER — PATIENT MESSAGE (OUTPATIENT)
Dept: INTERNAL MEDICINE | Facility: CLINIC | Age: 30
End: 2020-05-04

## 2020-05-04 DIAGNOSIS — E55.9 VITAMIN D INSUFFICIENCY: Primary | ICD-10-CM

## 2020-05-04 RX ORDER — ERGOCALCIFEROL 1.25 MG/1
50000 CAPSULE ORAL
Qty: 12 CAPSULE | Refills: 3 | Status: SHIPPED | OUTPATIENT
Start: 2020-05-04 | End: 2022-12-02

## 2020-05-20 RX ORDER — ACYCLOVIR 400 MG/1
TABLET ORAL
Qty: 15 TABLET | Refills: 4 | Status: SHIPPED | OUTPATIENT
Start: 2020-05-20 | End: 2022-12-02

## 2020-05-21 ENCOUNTER — PATIENT MESSAGE (OUTPATIENT)
Dept: INTERNAL MEDICINE | Facility: CLINIC | Age: 30
End: 2020-05-21

## 2020-05-22 ENCOUNTER — PATIENT MESSAGE (OUTPATIENT)
Dept: OBSTETRICS AND GYNECOLOGY | Facility: CLINIC | Age: 30
End: 2020-05-22

## 2020-06-18 ENCOUNTER — LAB VISIT (OUTPATIENT)
Dept: LAB | Facility: HOSPITAL | Age: 30
End: 2020-06-18
Attending: OBSTETRICS & GYNECOLOGY
Payer: COMMERCIAL

## 2020-06-18 ENCOUNTER — OFFICE VISIT (OUTPATIENT)
Dept: OBSTETRICS AND GYNECOLOGY | Facility: CLINIC | Age: 30
End: 2020-06-18
Payer: COMMERCIAL

## 2020-06-18 VITALS
DIASTOLIC BLOOD PRESSURE: 78 MMHG | SYSTOLIC BLOOD PRESSURE: 118 MMHG | WEIGHT: 141.31 LBS | BODY MASS INDEX: 24.26 KG/M2

## 2020-06-18 DIAGNOSIS — Z32.00 POSSIBLE PREGNANCY, NOT YET CONFIRMED: ICD-10-CM

## 2020-06-18 DIAGNOSIS — Z32.00 POSSIBLE PREGNANCY, NOT YET CONFIRMED: Primary | ICD-10-CM

## 2020-06-18 LAB — HCG INTACT+B SERPL-ACNC: <1.2 MIU/ML

## 2020-06-18 PROCEDURE — 36415 COLL VENOUS BLD VENIPUNCTURE: CPT

## 2020-06-18 PROCEDURE — 81025 URINE PREGNANCY TEST: CPT | Mod: S$GLB,,, | Performed by: OBSTETRICS & GYNECOLOGY

## 2020-06-18 PROCEDURE — 3008F PR BODY MASS INDEX (BMI) DOCUMENTED: ICD-10-PCS | Mod: CPTII,S$GLB,, | Performed by: OBSTETRICS & GYNECOLOGY

## 2020-06-18 PROCEDURE — 99999 PR PBB SHADOW E&M-EST. PATIENT-LVL III: ICD-10-PCS | Mod: PBBFAC,,, | Performed by: OBSTETRICS & GYNECOLOGY

## 2020-06-18 PROCEDURE — 99212 PR OFFICE/OUTPT VISIT, EST, LEVL II, 10-19 MIN: ICD-10-PCS | Mod: S$GLB,,, | Performed by: OBSTETRICS & GYNECOLOGY

## 2020-06-18 PROCEDURE — 84702 CHORIONIC GONADOTROPIN TEST: CPT

## 2020-06-18 PROCEDURE — 99212 OFFICE O/P EST SF 10 MIN: CPT | Mod: S$GLB,,, | Performed by: OBSTETRICS & GYNECOLOGY

## 2020-06-18 PROCEDURE — 81025 PR  URINE PREGNANCY TEST: ICD-10-PCS | Mod: S$GLB,,, | Performed by: OBSTETRICS & GYNECOLOGY

## 2020-06-18 PROCEDURE — 99999 PR PBB SHADOW E&M-EST. PATIENT-LVL III: CPT | Mod: PBBFAC,,, | Performed by: OBSTETRICS & GYNECOLOGY

## 2020-06-18 PROCEDURE — 3008F BODY MASS INDEX DOCD: CPT | Mod: CPTII,S$GLB,, | Performed by: OBSTETRICS & GYNECOLOGY

## 2020-06-18 NOTE — PROGRESS NOTES
Subjective:       Patient ID: Alyssa Urena is a 29 y.o. female.    Chief Complaint:  Possible Pregnancy      History of Present Illness  HPI  Missed Menses/ Possible Pregnancy  Patient complains of missed menses. She believes she could be pregnant. Pregnancy is desired. Sexual Activity: single partner, contraception: none. Current symptoms also include: breast tenderness, fatigue, nausea and positive home pregnancy test (took 3 separate tests yesterday, all with positive results). Last period was normal.     No LMP recorded.       GYN & OB History  No LMP recorded.   Date of Last Pap: 2019    OB History    Para Term  AB Living   3 1 1   2 1   SAB TAB Ectopic Multiple Live Births   1   1   1      # Outcome Date GA Lbr Evaristo/2nd Weight Sex Delivery Anes PTL Lv   3 SAB 2018           2 Ectopic 2013           1 Term 2009    F Vag-Spont   ALESHIA       Review of Systems  Review of Systems   Constitutional: Positive for fatigue. Negative for activity change, appetite change, chills, fever and unexpected weight change.   Respiratory: Negative for shortness of breath.    Cardiovascular: Negative for chest pain, palpitations and leg swelling.   Gastrointestinal: Positive for nausea. Negative for abdominal pain, bloating, blood in stool, constipation, diarrhea and vomiting.   Genitourinary: Positive for menstrual problem. Negative for dysmenorrhea, dyspareunia, dysuria, flank pain, frequency, genital sores, hematuria, menorrhagia, pelvic pain, urgency, vaginal bleeding, vaginal discharge, vaginal pain, urinary incontinence, postcoital bleeding, vaginal dryness and vaginal odor.   Musculoskeletal: Negative for back pain.   Neurological: Negative for syncope and headaches.           Objective:    Physical Exam:   Constitutional: She is oriented to person, place, and time. She appears well-developed and well-nourished. No distress.               Genitourinary:    Genitourinary Comments: UPT today  Negative                 Neurological: She is alert and oriented to person, place, and time.     Psychiatric: She has a normal mood and affect. Her behavior is normal. Thought content normal.          Assessment:        1. Possible pregnancy, not yet confirmed             Plan:      Possible pregnancy, not yet confirmed  -     hCG, quantitative; Future; Expected date: 06/18/2020  -     Clinic UPT today was negative, but pt reports having positive UPT at home yesterday along with pregnancy symptoms.  Will obtain HCG for confirmation.  If pregnancy confirmed, DAVEY would be 02/11/2021 (6w0d today).  Plan to follow up with ultrasound and IOB scheduling if positive.

## 2020-10-14 ENCOUNTER — OFFICE VISIT (OUTPATIENT)
Dept: OBSTETRICS AND GYNECOLOGY | Facility: CLINIC | Age: 30
End: 2020-10-14
Payer: COMMERCIAL

## 2020-10-14 DIAGNOSIS — N94.6 DYSMENORRHEA: ICD-10-CM

## 2020-10-14 DIAGNOSIS — N92.0 MENORRHAGIA WITH REGULAR CYCLE: Primary | ICD-10-CM

## 2020-10-14 PROCEDURE — 99212 PR OFFICE/OUTPT VISIT, EST, LEVL II, 10-19 MIN: ICD-10-PCS | Mod: 95,,, | Performed by: OBSTETRICS & GYNECOLOGY

## 2020-10-14 PROCEDURE — 99212 OFFICE O/P EST SF 10 MIN: CPT | Mod: 95,,, | Performed by: OBSTETRICS & GYNECOLOGY

## 2020-10-14 RX ORDER — NORGESTIMATE AND ETHINYL ESTRADIOL 0.25-0.035
1 KIT ORAL DAILY
Qty: 28 TABLET | Refills: 11 | Status: SHIPPED | OUTPATIENT
Start: 2020-10-14 | End: 2022-12-02

## 2020-10-14 NOTE — PROGRESS NOTES
The patient location is: work  The chief complaint leading to consultation is: heavy and painful menses    Visit type: audiovisual    Face to Face time with patient: 15 minutes of total time spent on the encounter, which includes face to face time and non-face to face time preparing to see the patient (eg, review of tests), Obtaining and/or reviewing separately obtained history, Documenting clinical information in the electronic or other health record, Independently interpreting results (not separately reported) and communicating results to the patient/family/caregiver, or Care coordination (not separately reported).         Each patient to whom he or she provides medical services by telemedicine is:  (1) informed of the relationship between the physician and patient and the respective role of any other health care provider with respect to management of the patient; and (2) notified that he or she may decline to receive medical services by telemedicine and may withdraw from such care at any time.    Notes:   Pt reports complaints of worsening menses.  They remain regular but are now lasting 8-10 days and are very heavy.  She is also experiencing significant cramping.  Pt reports that she is no longer trying for IVF (was seeing Dr. Lowe) and requests to discuss treatment options.  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    A/P:  Menorrhagia with regular cycle  -     norgestimate-ethinyl estradioL (ORTHO-CYCLEN) 0.25-35 mg-mcg per tablet; Take 1 tablet by mouth once daily.  Dispense: 28 tablet; Refill: 11  -     Pt was counseled on treatment options, including associated risks and benefits of each.  Pt voiced understanding and desires to proceed with OCP.  Medication dosing, side-effects, risks, benefits, and alternatives were discussed.  Medical history was reviewed and pt is a candidate for OCP use.    Dysmenorrhea      Follow up in about 3  months (around 1/14/2021).

## 2021-08-10 NOTE — PROGRESS NOTES
08/10/21 7:47 AM     See documentation in the [employer] SmartForm       [facility] Initial Visit (MD/NP)    9/25/2017    Alyssa Urena, a 26 y.o. female, presenting for initial evaluation visit. Met with patient.    Reason for Encounter: follow-up    IntervalHx: Patient seen & interviewed for follow-up, ~2 months since last visit. Reports improved moods since last visit, functioning ok without impairment, but experiencing excessive AM sedation since starting seroquel. Life stressors are unchanged. Denies new symptoms since last visit. Adherence to both medications has been good. No other side effects besides sedation. No new concomitant medications.     Background: 25 y/o F referred by PA for mood lability. First presented to [facility] in February:     3 week complaint of stress and anxiety at work... states that her job they're understaffed and she feels overworked most of the time... anxiety is intermittent moderate to severe intensity feeling of being overwhelmed. She states sometimes she has to just go to a room by herself... associated trouble sleeping stating that she wakes up many times during the night wondering about job duties. She says she is also getting an abbreviated amount of sleep since she usually has been about 10 or 11 PM and wakes up almost every morning at 3 AM. Was started on venlafaxine. She requested & received an increased dose in March. She had an evaluation for unexplained CP and dizziness in June (workup was unrevealing). Re-presented in July for ongoing mood symptoms. From BRIDGET[facility] Cruz's note (highlights mine): 26 year old female c/o mood swings X couple weeks. Pt does not have a PCP. She reports intermittent mood swings from feeling happy to feeling very sad and crying. She reports she does not know why they are happening and reports no trauma or recent changes in her life. No new stressors. She denies any SI/HI. She reports she lives with her parents and they are supportive. She reports bipolar disorder runs in her family and she is  "concerned she may have this. She is taking Effexor daily. She also had chest pain with associated spinning dizziness about one month ago and was seen by a different provider at that time. EKG & CXR were normal and TSH was slightly elevated - FT4 normal. She reports early CAD family history and has not seen a cardiologist. She reports chest pain (which occurred randomly) seems to have resolved and she denies any current CP. She has taken Antivert for her dizziness with relief. She reports no palpitations, fever, chills, cough, edema, exertional sxs, abd pain, chance of pregnancy, N/V, diaphoresis, sxs of infection, or other medical complaints. She reports she does not smoke. Confirms this hx today, reports "my mood swings are crazy". "I go from super happy to super depressed and I don't know why." Experiences "dark thoughts" including suicidal thoughts & fantasies, but no plans or intent. Denies new stressors identified to trigger moods. Denies episodes prior to recent months. Moods initially swung between euthymic, sad without provocation for weeks but then between euthymic, anxious & sad, & most recently have began to be mostly irritable with tinge of anxiety, featuring frequent racing thoughts whitout pressured speech. Drinks daily - "it calms me". More irritable without it, relaxed with it. No withdrawal symptoms on discontinuation. No tolerance. Bottle of wine daily x 1 year. Rare beer or liquor. Wasn't originally to bring self relief. Had one hangover. No morning alcohol. None during workday. No illicits. No prescription misuse. PsychHx: as above; denies otherwise. MedHx: Heart starts racing, short of breath, crying spells. Triggered by stress. Doesn't happen at home. Never happens for no reason at all. Meds: effexor since February or March - sebas better with anxiety. "not a complete breakdown". antivert - workup with PCP. FamHx: GM - ; no substance abuse problems. SocHx: reviewed. bipolar/scz, aunt bipolar " "disorder. Works as medical assistant for orthopedic surgeons, "takes a lot of the messages".    Review Of Systems:     GENERAL:  No weight gain or loss  SKIN:  No rashes or lacerations  HEAD:  No headaches  EYES:  No exophthalmos, jaundice or blindness  EARS:  No dizziness, tinnitus or hearing loss  NOSE:  No changes in smell  MOUTH & THROAT:  No dyskinetic movements or obvious goiter  CHEST:  No shortness of breath, hyperventilation or cough  CARDIOVASCULAR:  No tachycardia or chest pain  ABDOMEN:  No nausea, vomiting, pain, constipation or diarrhea  URINARY:  No frequency, dysuria or sexual dysfunction  ENDOCRINE:  No polydipsia, polyuria  MUSCULOSKELETAL:  No pain or stiffness of the joints  NEUROLOGIC:  No weakness, sensory changes, seizures, confusion, memory loss, tremor or other abnormal movements      Current Evaluation:     Nutritional Screening: Considering the patient's height and weight, medications, medical history and preferences, should a referral be made to the dietitian? no    Constitutional  Vitals:  Most recent vital signs, dated less than 90 days prior to this appointment, were not reviewed.    There were no vitals filed for this visit.     General:  unremarkable, age appropriate     Musculoskeletal  Muscle Strength/Tone:  no dystonia, no tremor   Gait & Station:  non-ataxic     Psychiatric  Appearance: casually dressed & groomed;   Behavior: calm,   Cooperation: cooperative with assessment  Speech: normal rate, volume, tone  Thought Process: linear, goal-directed  Thought Content: No suicidal or homicidal ideation; no delusions  Affect: anxious  Mood: irritable  Perceptions: No auditory or visual hallucinations  Level of Consciousness: alert throughout interview  Insight: fair  Cognition: Oriented to person, place, time, & situation  Memory: no apparent deficits to general clinical interview; not formally assessed  Attention/Concentration: no apparent deficits to general clinical interview; not " formally assessed  Fund of Knowledge: average by vocabulary/education    Laboratory Data  No visits with results within 1 Month(s) from this visit.   Latest known visit with results is:   Lab Visit on 08/22/2017   Component Date Value Ref Range Status    Cholesterol 08/22/2017 137  120 - 199 mg/dL Final    Triglycerides 08/22/2017 51  30 - 150 mg/dL Final    HDL 08/22/2017 48  40 - 75 mg/dL Final    LDL Cholesterol 08/22/2017 78.8  63.0 - 159.0 mg/dL Final    HDL/Chol Ratio 08/22/2017 35.0  20.0 - 50.0 % Final    Total Cholesterol/HDL Ratio 08/22/2017 2.9  2.0 - 5.0 Final    Non-HDL Cholesterol 08/22/2017 89  mg/dL Final     Medications  Outpatient Encounter Prescriptions as of 9/25/2017   Medication Sig Dispense Refill    quetiapine (SEROQUEL) 100 MG Tab TAKE 1/2 TABLET BY MOUTH AT BEDTIME FOR 3 DAYS THEN 1 TABLET AT BEDTIME FOR 3 DAYS THEN 1 AND 1/2 TABLETS AT BEDTIME 2 tablet 0    venlafaxine (EFFEXOR-XR) 75 MG 24 hr capsule Take 2 capsules (150 mg total) by mouth once daily. 60 capsule 2     No facility-administered encounter medications on file as of 9/25/2017.      Assessment - Diagnosis - Goals:     Impression: Greater than 6 months of intermittent mood episodes starting with anxiety but later featuring prolonged considerably lability, low moods, suicidal thoughts, irritability. Unclear current benefit from venlafaxine, but no worse.  Moods considerably more stable since last visit. AM sedation with current quetiapine dose.     Dx: bipolar 2 disorder    Treatment Goals:  Specify outcomes written in observable, behavioral terms:   Decrease mood lability, foster euthymia by self-report, questionnaires    Treatment Plan/Recommendations:   · Reduce quetiapine to 100 mg qhs. May divide that dose (50 qEvening, 50 qhs) if better tolerated. Continue venlafaxine. Discussed risks, benefits, & alternatives to treatment plan documented above with patient. I answered all patient questions related to this plan &  patient expressed understanding & agreement.   · Consider alternatives if still excessively sedated with reduced dose quetiapine.   · F/u, 2 months.     Return to Clinic: 2 months    Counseling time: 5 minutes  Total time: 20 minutes    HIMA Guillen MD

## 2022-08-24 ENCOUNTER — PATIENT OUTREACH (OUTPATIENT)
Dept: ADMINISTRATIVE | Facility: HOSPITAL | Age: 32
End: 2022-08-24
Payer: COMMERCIAL

## 2022-09-16 ENCOUNTER — PATIENT OUTREACH (OUTPATIENT)
Dept: ADMINISTRATIVE | Facility: HOSPITAL | Age: 32
End: 2022-09-16
Payer: COMMERCIAL

## 2025-01-03 NOTE — TELEPHONE ENCOUNTER
Attempted to call patient with no answer, my [facility] message sent to patient to offer a virtual visit with Dr De La Garza.   Nurse to nurse report given to C4 RN